# Patient Record
Sex: FEMALE | Race: WHITE | NOT HISPANIC OR LATINO | Employment: OTHER | URBAN - METROPOLITAN AREA
[De-identification: names, ages, dates, MRNs, and addresses within clinical notes are randomized per-mention and may not be internally consistent; named-entity substitution may affect disease eponyms.]

---

## 2017-03-27 ENCOUNTER — HOSPITAL ENCOUNTER (OUTPATIENT)
Dept: LAB | Facility: MEDICAL CENTER | Age: 77
End: 2017-03-27
Attending: SPECIALIST
Payer: MEDICARE

## 2017-03-27 LAB
ALBUMIN SERPL BCP-MCNC: 4.2 G/DL (ref 3.2–4.9)
ALBUMIN/GLOB SERPL: 1.2 G/DL
ALP SERPL-CCNC: 49 U/L (ref 30–99)
ALT SERPL-CCNC: 20 U/L (ref 2–50)
ANION GAP SERPL CALC-SCNC: 7 MMOL/L (ref 0–11.9)
AST SERPL-CCNC: 16 U/L (ref 12–45)
BASOPHILS # BLD AUTO: 0.05 K/UL (ref 0–0.12)
BASOPHILS NFR BLD AUTO: 0.8 % (ref 0–1.8)
BILIRUB SERPL-MCNC: 0.9 MG/DL (ref 0.1–1.5)
BUN SERPL-MCNC: 15 MG/DL (ref 8–22)
CALCIUM SERPL-MCNC: 9.6 MG/DL (ref 8.5–10.5)
CANCER AG125 SERPL-ACNC: 4.4 U/ML (ref 0–35)
CHLORIDE SERPL-SCNC: 100 MMOL/L (ref 96–112)
CO2 SERPL-SCNC: 28 MMOL/L (ref 20–33)
CREAT SERPL-MCNC: 0.69 MG/DL (ref 0.5–1.4)
EOSINOPHIL # BLD: 0.15 K/UL (ref 0–0.51)
EOSINOPHIL NFR BLD AUTO: 2.5 % (ref 0–6.9)
ERYTHROCYTE [DISTWIDTH] IN BLOOD BY AUTOMATED COUNT: 44.5 FL (ref 35.9–50)
GLOBULIN SER CALC-MCNC: 3.4 G/DL (ref 1.9–3.5)
GLUCOSE SERPL-MCNC: 121 MG/DL (ref 65–99)
HCT VFR BLD AUTO: 44.8 % (ref 37–47)
HGB BLD-MCNC: 15.2 G/DL (ref 12–16)
IMM GRANULOCYTES # BLD AUTO: 0.02 K/UL (ref 0–0.11)
IMM GRANULOCYTES NFR BLD AUTO: 0.3 % (ref 0–0.9)
LYMPHOCYTES # BLD: 1.32 K/UL (ref 1–4.8)
LYMPHOCYTES NFR BLD AUTO: 22.3 % (ref 22–41)
MCH RBC QN AUTO: 33 PG (ref 27–33)
MCHC RBC AUTO-ENTMCNC: 33.9 G/DL (ref 33.6–35)
MCV RBC AUTO: 97.4 FL (ref 81.4–97.8)
MONOCYTES # BLD: 0.61 K/UL (ref 0–0.85)
MONOCYTES NFR BLD AUTO: 10.3 % (ref 0–13.4)
NEUTROPHILS # BLD: 3.78 K/UL (ref 2–7.15)
NEUTROPHILS NFR BLD AUTO: 63.8 % (ref 44–72)
NRBC # BLD AUTO: 0.02 K/UL
NRBC BLD-RTO: 0.3 /100 WBC
PLATELET # BLD AUTO: 203 K/UL (ref 164–446)
PMV BLD AUTO: 10 FL (ref 9–12.9)
POTASSIUM SERPL-SCNC: 4.2 MMOL/L (ref 3.6–5.5)
PROT SERPL-MCNC: 7.6 G/DL (ref 6–8.2)
RBC # BLD AUTO: 4.6 M/UL (ref 4.2–5.4)
SODIUM SERPL-SCNC: 135 MMOL/L (ref 135–145)
WBC # BLD AUTO: 5.9 K/UL (ref 4.8–10.8)

## 2017-03-27 PROCEDURE — 36415 COLL VENOUS BLD VENIPUNCTURE: CPT

## 2017-03-27 PROCEDURE — 86304 IMMUNOASSAY TUMOR CA 125: CPT

## 2017-03-27 PROCEDURE — 80053 COMPREHEN METABOLIC PANEL: CPT

## 2017-03-27 PROCEDURE — 85025 COMPLETE CBC W/AUTO DIFF WBC: CPT

## 2018-01-22 ENCOUNTER — HOSPITAL ENCOUNTER (OUTPATIENT)
Dept: RADIOLOGY | Facility: MEDICAL CENTER | Age: 78
End: 2018-01-22
Attending: SPECIALIST
Payer: MEDICARE

## 2018-01-22 DIAGNOSIS — Z12.31 ENCOUNTER FOR SCREENING MAMMOGRAM FOR MALIGNANT NEOPLASM OF BREAST: ICD-10-CM

## 2018-01-22 PROCEDURE — 77067 SCR MAMMO BI INCL CAD: CPT

## 2019-01-18 ENCOUNTER — OFFICE VISIT (OUTPATIENT)
Dept: URGENT CARE | Facility: CLINIC | Age: 79
End: 2019-01-18
Payer: MEDICARE

## 2019-01-18 VITALS
HEIGHT: 63 IN | DIASTOLIC BLOOD PRESSURE: 80 MMHG | OXYGEN SATURATION: 95 % | BODY MASS INDEX: 30.3 KG/M2 | WEIGHT: 171 LBS | HEART RATE: 78 BPM | TEMPERATURE: 97.4 F | SYSTOLIC BLOOD PRESSURE: 114 MMHG | RESPIRATION RATE: 16 BRPM

## 2019-01-18 DIAGNOSIS — J06.9 UPPER RESPIRATORY TRACT INFECTION, UNSPECIFIED TYPE: ICD-10-CM

## 2019-01-18 PROCEDURE — 99203 OFFICE O/P NEW LOW 30 MIN: CPT | Performed by: PHYSICIAN ASSISTANT

## 2019-01-18 RX ORDER — POTASSIUM CHLORIDE 750 MG/1
CAPSULE, EXTENDED RELEASE ORAL
COMMUNITY
Start: 2019-01-14

## 2019-01-18 RX ORDER — BENZONATATE 100 MG/1
100 CAPSULE ORAL 3 TIMES DAILY PRN
Qty: 30 CAP | Refills: 0 | Status: SHIPPED | OUTPATIENT
Start: 2019-01-18 | End: 2022-10-07

## 2019-01-18 ASSESSMENT — ENCOUNTER SYMPTOMS
NECK PAIN: 0
RHINORRHEA: 1
COUGH: 1
TINGLING: 0
HEADACHES: 0
EYE DISCHARGE: 0
DIZZINESS: 0
CHILLS: 0
SORE THROAT: 1
MYALGIAS: 0
DIARRHEA: 0
EYE REDNESS: 0
SHORTNESS OF BREATH: 0
SPUTUM PRODUCTION: 1
FEVER: 0
WHEEZING: 0
VOMITING: 0

## 2019-01-18 NOTE — PROGRESS NOTES
Subjective:      Deana Monreal is a 78 y.o. female who presents with Cough (x 2 days, nonproductive cough, little nasal congestion, post nasal drip.  No sore throat today. )            Pt is an 77 y/o female who presents to  with sore throat that started 2 days ago- this is notably improved however then she started to have congestion and slight cough. She reports having her influenza vaccine- although reports that she was around large group of people recently and wanted to make sure. She denies any body aches or fevers at this time.         Cough   This is a new problem. Episode onset: 2-3 days ago. The problem has been waxing and waning. The problem occurs every few minutes. The cough is non-productive (Rare sputum production). Associated symptoms include nasal congestion, postnasal drip, rhinorrhea and a sore throat. Pertinent negatives include no chest pain, chills, ear pain, eye redness, fever, headaches, myalgias, rash, shortness of breath or wheezing. Nothing aggravates the symptoms. She has tried OTC cough suppressant (Robitussin cough drops) for the symptoms. There is no history of asthma or bronchitis.       Review of Systems   Constitutional: Negative for chills, fever and malaise/fatigue.   HENT: Positive for congestion, postnasal drip, rhinorrhea and sore throat. Negative for ear discharge and ear pain.    Eyes: Negative for discharge and redness.   Respiratory: Positive for cough and sputum production. Negative for shortness of breath and wheezing.    Cardiovascular: Negative for chest pain.   Gastrointestinal: Negative for diarrhea and vomiting.   Genitourinary: Negative for dysuria and urgency.   Musculoskeletal: Negative for myalgias and neck pain.   Skin: Negative for itching and rash.   Neurological: Negative for dizziness, tingling and headaches.   All other systems reviewed and are negative.         Objective:     /80 (BP Location: Left arm, Patient Position: Sitting, BP Cuff Size:  "Adult)   Pulse 78   Temp 36.3 °C (97.4 °F) (Temporal)   Resp 16   Ht 1.6 m (5' 3\")   Wt 77.6 kg (171 lb)   SpO2 95%   BMI 30.29 kg/m²    PMH:  has a past medical history of CATARACT and Hypertension.  MEDS:   Current Outpatient Prescriptions:   •  benzonatate (TESSALON) 100 MG Cap, Take 1 Cap by mouth 3 times a day as needed., Disp: 30 Cap, Rfl: 0  •  oxymetazoline (AFRIN) 0.05 % SOLN, Spray 2 Sprays in nose 2 times a day. Do not use greater than 3-5 ritesh , Disp: , Rfl:   •  metoprolol SR (TOPROL XL) 100 MG TB24, Take 100 mg by mouth every day., Disp: , Rfl:   •  valsartan-hydrochlorothiazide (DIOVAN HCT) 160-12.5 MG per tablet, Take 1 Tab by mouth every day., Disp: , Rfl:   •  potassium chloride (MICRO-K) 10 MEQ capsule, , Disp: , Rfl:   •  azithromycin (ZITHROMAX) 250 MG TABS, z-katie; U.D., Disp: 6 Tab, Rfl: 1  •  metoclopramide (REGLAN) 10 MG TABS, Take 10 mg by mouth every 6 hours as needed. 1 tablet before meals and at bedtime , Disp: , Rfl:   •  Non Formulary Request, Vitamin D, Magnesium 400 mg, omega 3; daily , Disp: , Rfl:   ALLERGIES:   Allergies   Allergen Reactions   • Other Environmental Runny Nose     hayfever-runny nose     SURGHX:   Past Surgical History:   Procedure Laterality Date   • HYSTERECTOMY ROBOTIC  4/23/2012    Performed by NAI HUITRON at [facility]   • LYMPH NODE EXCISION  4/23/2012    Performed by NAI HUITRON at [facility]   • DEBULKING  4/23/2012    Performed by NAI HUITRON at [facility]   • ABDOMINAL HYSTERECTOMY TOTAL  4/23/2012    Performed by NAI HUITRON at [facility]   • OMENTECTOMY  4/23/2012    Performed by ANI HUITRON at [facility]ERY [facility]   • OTHER  1/18/2012    detached retina, vitrectomy left eye, [place]   • PB APPENDECTOMY  12/29/2010    Dr. Gilmore   • US-NEEDLE CORE BX-BREAST PANEL       SOCHX:  reports that she has never smoked. She has never used smokeless tobacco. She reports that she drinks " alcohol. She reports that she does not use drugs.  FH: Family history was reviewed, no pertinent findings to report    Physical Exam   Constitutional: She is oriented to person, place, and time. She appears well-developed and well-nourished.   HENT:   Head: Normocephalic and atraumatic.   Mouth/Throat: No oropharyngeal exudate.   Ears- Canals clear- TM- with clear fluid effusions bilaterally.   Pos. PND, with slight erythema- without tonsillar edema or exudate.   Mild discharge noted bilaterally- to nares.      Eyes: Pupils are equal, round, and reactive to light. EOM are normal.   Neck: Normal range of motion. Neck supple.   Cardiovascular: Normal rate and regular rhythm.    No murmur heard.  Pulmonary/Chest: Effort normal and breath sounds normal. No respiratory distress.   Musculoskeletal: Normal range of motion. She exhibits no tenderness.   Lymphadenopathy:     She has no cervical adenopathy.   Neurological: She is alert and oriented to person, place, and time.   Skin: Skin is warm. No rash noted.   Psychiatric: She has a normal mood and affect. Her behavior is normal.   Vitals reviewed.              Assessment/Plan:     1. Upper respiratory tract infection, unspecified type  - benzonatate (TESSALON) 100 MG Cap; Take 1 Cap by mouth 3 times a day as needed.  Dispense: 30 Cap; Refill: 0    It was explained to the pt. Today that due to the viral nature of the pt's illness, we will treat symptomatically today. Encouraged OTC supportive meds PRN. Humidification, increase fluids, avoid night time dairy.   Patient given precautionary s/sx that mandate immediate follow up and evaluation in the ED. Advised of risks of not doing so.    DDX, Supportive care, and indications for immediate follow-up discussed with patient.    Instructed to return to clinic or nearest emergency department if we are not available for any change in condition, further concerns, or worsening of symptoms.    The patient demonstrated a good  understanding and agreed with the treatment plan.

## 2019-04-01 ENCOUNTER — HOSPITAL ENCOUNTER (OUTPATIENT)
Dept: RADIOLOGY | Facility: MEDICAL CENTER | Age: 79
End: 2019-04-01
Attending: INTERNAL MEDICINE
Payer: MEDICARE

## 2019-04-01 DIAGNOSIS — Z12.31 VISIT FOR SCREENING MAMMOGRAM: ICD-10-CM

## 2019-04-01 PROCEDURE — 77063 BREAST TOMOSYNTHESIS BI: CPT

## 2019-05-10 ENCOUNTER — HOSPITAL ENCOUNTER (OUTPATIENT)
Dept: LAB | Facility: MEDICAL CENTER | Age: 79
End: 2019-05-10
Attending: SPECIALIST
Payer: MEDICARE

## 2019-05-10 LAB — CANCER AG125 SERPL-ACNC: 5.4 U/ML (ref 0–35)

## 2019-05-10 PROCEDURE — 36415 COLL VENOUS BLD VENIPUNCTURE: CPT

## 2019-05-10 PROCEDURE — 86304 IMMUNOASSAY TUMOR CA 125: CPT

## 2020-08-25 ENCOUNTER — HOSPITAL ENCOUNTER (OUTPATIENT)
Dept: LAB | Facility: MEDICAL CENTER | Age: 80
End: 2020-08-25
Attending: SPECIALIST
Payer: MEDICARE

## 2020-08-25 LAB — CANCER AG125 SERPL-ACNC: 17.3 U/ML (ref 0–35)

## 2020-08-25 PROCEDURE — 86304 IMMUNOASSAY TUMOR CA 125: CPT | Mod: GA

## 2020-08-25 PROCEDURE — 36415 COLL VENOUS BLD VENIPUNCTURE: CPT | Mod: GA

## 2020-09-24 ENCOUNTER — HOSPITAL ENCOUNTER (OUTPATIENT)
Dept: RADIOLOGY | Facility: MEDICAL CENTER | Age: 80
End: 2020-09-24
Attending: INTERNAL MEDICINE
Payer: MEDICARE

## 2020-09-24 DIAGNOSIS — R10.9 ABDOMINAL PAIN, RIGHT LATERAL: ICD-10-CM

## 2020-09-24 PROCEDURE — 76830 TRANSVAGINAL US NON-OB: CPT

## 2020-09-24 PROCEDURE — 76700 US EXAM ABDOM COMPLETE: CPT

## 2021-01-12 DIAGNOSIS — Z23 NEED FOR VACCINATION: ICD-10-CM

## 2021-01-21 ENCOUNTER — IMMUNIZATION (OUTPATIENT)
Dept: FAMILY PLANNING/WOMEN'S HEALTH CLINIC | Facility: IMMUNIZATION CENTER | Age: 81
End: 2021-01-21
Attending: INTERNAL MEDICINE
Payer: MEDICARE

## 2021-01-21 DIAGNOSIS — Z23 NEED FOR VACCINATION: ICD-10-CM

## 2021-01-21 DIAGNOSIS — Z23 ENCOUNTER FOR VACCINATION: Primary | ICD-10-CM

## 2021-01-21 PROCEDURE — 0001A PFIZER SARS-COV-2 VACCINE: CPT

## 2021-01-21 PROCEDURE — 91300 PFIZER SARS-COV-2 VACCINE: CPT

## 2021-02-11 ENCOUNTER — IMMUNIZATION (OUTPATIENT)
Dept: FAMILY PLANNING/WOMEN'S HEALTH CLINIC | Facility: IMMUNIZATION CENTER | Age: 81
End: 2021-02-11
Attending: INTERNAL MEDICINE
Payer: MEDICARE

## 2021-02-11 DIAGNOSIS — Z23 ENCOUNTER FOR VACCINATION: Primary | ICD-10-CM

## 2021-02-11 PROCEDURE — 0002A PFIZER SARS-COV-2 VACCINE: CPT

## 2021-02-11 PROCEDURE — 91300 PFIZER SARS-COV-2 VACCINE: CPT

## 2022-10-07 ENCOUNTER — OFFICE VISIT (OUTPATIENT)
Dept: URGENT CARE | Facility: CLINIC | Age: 82
End: 2022-10-07
Payer: MEDICARE

## 2022-10-07 VITALS
BODY MASS INDEX: 26.58 KG/M2 | SYSTOLIC BLOOD PRESSURE: 154 MMHG | RESPIRATION RATE: 16 BRPM | OXYGEN SATURATION: 95 % | HEART RATE: 78 BPM | DIASTOLIC BLOOD PRESSURE: 82 MMHG | TEMPERATURE: 98.7 F | HEIGHT: 63 IN | WEIGHT: 150 LBS

## 2022-10-07 DIAGNOSIS — R03.0 ELEVATED BLOOD PRESSURE READING: ICD-10-CM

## 2022-10-07 PROCEDURE — 99203 OFFICE O/P NEW LOW 30 MIN: CPT | Performed by: FAMILY MEDICINE

## 2022-10-07 RX ORDER — OLMESARTAN MEDOXOMIL 40 MG/1
TABLET ORAL
Status: ON HOLD | COMMUNITY
Start: 2022-08-02 | End: 2023-07-31

## 2022-10-07 RX ORDER — CLONIDINE HYDROCHLORIDE 0.1 MG/1
TABLET ORAL
Status: ON HOLD | COMMUNITY
Start: 2022-08-19 | End: 2023-07-31

## 2022-10-07 ASSESSMENT — ENCOUNTER SYMPTOMS
EYE REDNESS: 0
NAUSEA: 0
EYE DISCHARGE: 0
MYALGIAS: 0
WEIGHT LOSS: 0
VOMITING: 0

## 2022-10-08 NOTE — PROGRESS NOTES
"Subjective     Deana Monreal is a 82 y.o. female who presents with Blood Pressure Problem (Blood pressure has been high for past 3 days / 173 / 106 )            3 days elevated blood pressure reading. Highest last night 173/106. This morning 149/97. She has been taking her olmesartan an metoprolol as prescribed. She has previously had good control with this combination. She took 0.1mg clonidine without improvment of BP last night. No HA. No vision change. No unilateral weakness. No CP. No change in urine output. No PMH CAD. She did take another clonidine today at 14:45. No OTC medications. No other aggravating or alleviating factors.        Review of Systems   Constitutional:  Negative for malaise/fatigue and weight loss.   Eyes:  Negative for discharge and redness.   Gastrointestinal:  Negative for nausea and vomiting.   Musculoskeletal:  Negative for joint pain and myalgias.   Skin:  Negative for itching and rash.            Objective     BP (!) 154/82 (BP Location: Left arm, Patient Position: Sitting, BP Cuff Size: Adult)   Pulse 78   Temp 37.1 °C (98.7 °F) (Temporal)   Resp 16   Ht 1.6 m (5' 3\")   Wt 68 kg (150 lb)   SpO2 95%   BMI 26.57 kg/m²      Physical Exam  Constitutional:       General: She is not in acute distress.     Appearance: She is well-developed.   HENT:      Head: Normocephalic and atraumatic.   Eyes:      Conjunctiva/sclera: Conjunctivae normal.   Cardiovascular:      Rate and Rhythm: Normal rate and regular rhythm.      Heart sounds: Normal heart sounds. No murmur heard.  Pulmonary:      Effort: Pulmonary effort is normal.      Breath sounds: Normal breath sounds. No wheezing.   Skin:     General: Skin is warm and dry.      Findings: No rash.   Neurological:      General: No focal deficit present.      Mental Status: She is alert and oriented to person, place, and time.                           Assessment & Plan          1. Elevated blood pressure reading          Differential " diagnosis, natural history, supportive care, and indications for immediate follow-up discussed at length.     No clinical evidence of endorgan damage.  She will increase her nightly metoprolol dose from 50 to 100 mg and continue blood pressure log.  Discussed using clonidine sparingly and discussed possibility for rebound with clonidine.  Follow-up with primary care on Monday.

## 2022-11-11 ENCOUNTER — PATIENT MESSAGE (OUTPATIENT)
Dept: HEALTH INFORMATION MANAGEMENT | Facility: OTHER | Age: 82
End: 2022-11-11

## 2023-02-16 ENCOUNTER — HOSPITAL ENCOUNTER (OUTPATIENT)
Dept: LAB | Facility: MEDICAL CENTER | Age: 83
End: 2023-02-16
Payer: MEDICARE

## 2023-02-16 LAB
ANION GAP SERPL CALC-SCNC: 13 MMOL/L (ref 7–16)
BUN SERPL-MCNC: 10 MG/DL (ref 8–22)
CALCIUM SERPL-MCNC: 9.4 MG/DL (ref 8.5–10.5)
CANCER AG125 SERPL-ACNC: 33 U/ML (ref 0–35)
CHLORIDE SERPL-SCNC: 100 MMOL/L (ref 96–112)
CO2 SERPL-SCNC: 25 MMOL/L (ref 20–33)
CREAT SERPL-MCNC: 0.58 MG/DL (ref 0.5–1.4)
GFR SERPLBLD CREATININE-BSD FMLA CKD-EPI: 89 ML/MIN/1.73 M 2
GLUCOSE SERPL-MCNC: 110 MG/DL (ref 65–99)
POTASSIUM SERPL-SCNC: 4.2 MMOL/L (ref 3.6–5.5)
SODIUM SERPL-SCNC: 138 MMOL/L (ref 135–145)

## 2023-02-16 PROCEDURE — 36415 COLL VENOUS BLD VENIPUNCTURE: CPT

## 2023-02-16 PROCEDURE — 86304 IMMUNOASSAY TUMOR CA 125: CPT

## 2023-02-16 PROCEDURE — 80048 BASIC METABOLIC PNL TOTAL CA: CPT

## 2023-02-26 ENCOUNTER — HOSPITAL ENCOUNTER (OUTPATIENT)
Dept: RADIOLOGY | Facility: MEDICAL CENTER | Age: 83
End: 2023-02-26
Payer: MEDICARE

## 2023-02-26 DIAGNOSIS — C56.9 MALIGNANT NEOPLASM OF OVARY, UNSPECIFIED LATERALITY (HCC): ICD-10-CM

## 2023-02-26 DIAGNOSIS — R19.09 OTHER INTRA-ABDOMINAL AND PELVIC SWELLING, MASS AND LUMP: ICD-10-CM

## 2023-02-26 PROCEDURE — 74177 CT ABD & PELVIS W/CONTRAST: CPT

## 2023-02-26 PROCEDURE — 700117 HCHG RX CONTRAST REV CODE 255

## 2023-02-26 RX ADMIN — IOHEXOL 100 ML: 350 INJECTION, SOLUTION INTRAVENOUS at 14:55

## 2023-02-26 RX ADMIN — IOHEXOL 20 ML: 240 INJECTION, SOLUTION INTRATHECAL; INTRAVASCULAR; INTRAVENOUS; ORAL at 14:54

## 2023-04-20 ENCOUNTER — OFFICE VISIT (OUTPATIENT)
Dept: URGENT CARE | Facility: CLINIC | Age: 83
End: 2023-04-20
Payer: MEDICARE

## 2023-04-20 VITALS
TEMPERATURE: 98.3 F | WEIGHT: 155 LBS | HEART RATE: 64 BPM | DIASTOLIC BLOOD PRESSURE: 64 MMHG | BODY MASS INDEX: 27.46 KG/M2 | HEIGHT: 63 IN | OXYGEN SATURATION: 95 % | RESPIRATION RATE: 16 BRPM | SYSTOLIC BLOOD PRESSURE: 122 MMHG

## 2023-04-20 DIAGNOSIS — R03.0 ELEVATED BLOOD PRESSURE READING: ICD-10-CM

## 2023-04-20 PROBLEM — M25.552 PAIN OF LEFT HIP JOINT: Status: ACTIVE | Noted: 2019-06-25

## 2023-04-20 PROBLEM — M25.569 KNEE PAIN: Status: ACTIVE | Noted: 2019-06-25

## 2023-04-20 PROCEDURE — 99213 OFFICE O/P EST LOW 20 MIN: CPT | Performed by: PHYSICIAN ASSISTANT

## 2023-04-20 RX ORDER — POLYETHYLENE GLYCOL-3350 AND ELECTROLYTES 236; 6.74; 5.86; 2.97; 22.74 G/274.31G; G/274.31G; G/274.31G; G/274.31G; G/274.31G
POWDER, FOR SOLUTION ORAL
Status: ON HOLD | COMMUNITY
Start: 2023-03-08 | End: 2023-07-31

## 2023-04-20 RX ORDER — COVID-19 MOLECULAR TEST ASSAY
KIT MISCELLANEOUS
Status: ON HOLD | COMMUNITY
Start: 2023-01-26 | End: 2023-07-31

## 2023-04-20 RX ORDER — DONEPEZIL HYDROCHLORIDE 10 MG/1
TABLET, FILM COATED ORAL
Status: ON HOLD | COMMUNITY
Start: 2023-02-02 | End: 2023-07-31

## 2023-04-20 RX ORDER — MELOXICAM 15 MG/1
TABLET ORAL
Status: ON HOLD | COMMUNITY
Start: 2023-04-11 | End: 2023-07-31

## 2023-04-20 ASSESSMENT — ENCOUNTER SYMPTOMS
CHILLS: 0
FEVER: 0
ABDOMINAL PAIN: 0
EYE REDNESS: 0
COUGH: 0
WHEEZING: 0
VOMITING: 0
SORE THROAT: 0
EYE PAIN: 0
SHORTNESS OF BREATH: 0
DIAPHORESIS: 0
SINUS PAIN: 0
NAUSEA: 0
HEADACHES: 0
EYE DISCHARGE: 0
DIARRHEA: 0
DIZZINESS: 0
CONSTIPATION: 0

## 2023-04-21 NOTE — PROGRESS NOTES
"  Subjective:     Deana Monreal  is a 82 y.o. female who presents for Hypertension Follow-up (X2 days it went up after having a colonoscopy /Took clonodine 4:30pm today)       She presents today after having elevated blood pressure reading earlier today.  At home she had a blood pressure reading of 170/92 which did improve after she took a clonidine.  She does note that a few days ago she had elevated blood pressure readings of 160's/90 that was also resolved with clonidine.  She does have concern about the elevated blood pressure readings and presents today for evaluation.  She denies headache, no vision change or blurry vision, no change in hearing, no chest pain or shortness of breath, no upper or lower extremity weakness numbness or tingling.  She did have a colonoscopy 2 days ago, no residual abdominal pain, no rectal pain or bleeding, no urinary frequency or urgency, no pain with urination.  No fever/chills/sweats.     Review of Systems   Constitutional:  Negative for chills, diaphoresis, fever and malaise/fatigue.        Elevated blood pressure readings at home   HENT:  Negative for congestion, ear discharge, sinus pain and sore throat.    Eyes:  Negative for pain, discharge and redness.   Respiratory:  Negative for cough, shortness of breath and wheezing.    Cardiovascular:  Negative for chest pain.   Gastrointestinal:  Negative for abdominal pain, constipation, diarrhea, nausea and vomiting.   Genitourinary:  Negative for dysuria, frequency and urgency.   Neurological:  Negative for dizziness and headaches.    Allergies   Allergen Reactions    Other Environmental Runny Nose     hayfever-runny nose     Past Medical History:   Diagnosis Date    CATARACT     Hypertension         Objective:   /64 (BP Location: Left arm, Patient Position: Sitting, BP Cuff Size: Adult)   Pulse 64   Temp 36.8 °C (98.3 °F) (Temporal)   Resp 16   Ht 1.6 m (5' 3\")   Wt 70.3 kg (155 lb)   SpO2 95%   BMI 27.46 kg/m² "   Physical Exam  Vitals and nursing note reviewed.   Constitutional:       General: She is not in acute distress.     Appearance: Normal appearance. She is not ill-appearing, toxic-appearing or diaphoretic.   HENT:      Head: Normocephalic.      Right Ear: Tympanic membrane, ear canal and external ear normal. There is no impacted cerumen.      Left Ear: Tympanic membrane, ear canal and external ear normal. There is no impacted cerumen.      Nose: No congestion or rhinorrhea.      Mouth/Throat:      Mouth: Mucous membranes are moist.      Pharynx: No oropharyngeal exudate or posterior oropharyngeal erythema.   Eyes:      General:         Right eye: No discharge.         Left eye: No discharge.      Extraocular Movements: Extraocular movements intact.      Conjunctiva/sclera: Conjunctivae normal.      Pupils: Pupils are equal, round, and reactive to light.      Comments: No nystagmus on exam   Cardiovascular:      Rate and Rhythm: Normal rate and regular rhythm.   Pulmonary:      Effort: Pulmonary effort is normal. No respiratory distress.      Breath sounds: Normal breath sounds. No stridor. No wheezing or rhonchi.   Musculoskeletal:      Cervical back: Neck supple.   Lymphadenopathy:      Cervical: No cervical adenopathy.   Neurological:      General: No focal deficit present.      Mental Status: She is alert and oriented to person, place, and time.      Cranial Nerves: No cranial nerve deficit.      Sensory: No sensory deficit.   Psychiatric:         Mood and Affect: Mood normal.         Behavior: Behavior normal.         Thought Content: Thought content normal.         Judgment: Judgment normal.           Diagnostic testing: None    Assessment/Plan:     Encounter Diagnoses   Name Primary?    Elevated blood pressure reading           Plan for care for today's complaint includes continued watchful waiting technique at this time.  Patient did have elevated blood pressure readings that was fully resolved with  clonidine.  Discussed with patient that she does not show any signs of endorgan failure based on physical exam findings, recent lab work did show kidneys of normal function.  Discussed with the patient to follow-up with her primary care provider for reevaluation of her elevated blood pressure readings.  We discussed ER precautions, if these do arise and follow-up in the emergency department immediately for reevaluation..    See AVS Instructions below for written guidance provided to patient on after-visit management and care in addition to our verbal discussion during the visit.    Please note that this dictation was created using voice recognition software. I have attempted to correct all errors, but there may be sound-alike, spelling, grammar and possibly content errors that I did not discover before finalizing the note.    Gillespie Ernesto BROWN

## 2023-05-22 ENCOUNTER — HOSPITAL ENCOUNTER (OUTPATIENT)
Dept: RADIOLOGY | Facility: MEDICAL CENTER | Age: 83
End: 2023-05-22
Attending: SPECIALIST
Payer: MEDICARE

## 2023-05-22 DIAGNOSIS — R19.04 LEFT LOWER QUADRANT ABDOMINAL SWELLING, MASS AND LUMP: ICD-10-CM

## 2023-05-22 DIAGNOSIS — C56.9 MALIGNANT NEOPLASM OF OVARY, UNSPECIFIED LATERALITY (HCC): ICD-10-CM

## 2023-05-22 PROCEDURE — 74177 CT ABD & PELVIS W/CONTRAST: CPT

## 2023-05-22 PROCEDURE — 700117 HCHG RX CONTRAST REV CODE 255: Performed by: SPECIALIST

## 2023-05-22 RX ADMIN — IOHEXOL 100 ML: 350 INJECTION, SOLUTION INTRAVENOUS at 15:28

## 2023-05-22 RX ADMIN — IOHEXOL 25 ML: 240 INJECTION, SOLUTION INTRATHECAL; INTRAVASCULAR; INTRAVENOUS; ORAL at 15:29

## 2023-07-31 ENCOUNTER — APPOINTMENT (OUTPATIENT)
Dept: RADIOLOGY | Facility: MEDICAL CENTER | Age: 83
DRG: 863 | End: 2023-07-31
Attending: STUDENT IN AN ORGANIZED HEALTH CARE EDUCATION/TRAINING PROGRAM
Payer: MEDICARE

## 2023-07-31 ENCOUNTER — HOSPITAL ENCOUNTER (INPATIENT)
Facility: MEDICAL CENTER | Age: 83
LOS: 4 days | DRG: 863 | End: 2023-08-04
Attending: SPECIALIST | Admitting: SPECIALIST
Payer: MEDICARE

## 2023-07-31 PROBLEM — L03.90 CELLULITIS: Status: ACTIVE | Noted: 2023-07-31

## 2023-07-31 LAB
BASOPHILS # BLD AUTO: 0.6 % (ref 0–1.8)
BASOPHILS # BLD: 0.04 K/UL (ref 0–0.12)
BLOOD CULTURE HOLD CXBCH: NORMAL
BLOOD CULTURE HOLD CXBCH: NORMAL
EOSINOPHIL # BLD AUTO: 0.18 K/UL (ref 0–0.51)
EOSINOPHIL NFR BLD: 2.7 % (ref 0–6.9)
ERYTHROCYTE [DISTWIDTH] IN BLOOD BY AUTOMATED COUNT: 44.2 FL (ref 35.9–50)
HCT VFR BLD AUTO: 33.3 % (ref 37–47)
HGB BLD-MCNC: 11.2 G/DL (ref 12–16)
IMM GRANULOCYTES # BLD AUTO: 0.02 K/UL (ref 0–0.11)
IMM GRANULOCYTES NFR BLD AUTO: 0.3 % (ref 0–0.9)
LYMPHOCYTES # BLD AUTO: 1.47 K/UL (ref 1–4.8)
LYMPHOCYTES NFR BLD: 21.8 % (ref 22–41)
MAGNESIUM SERPL-MCNC: 1.8 MG/DL (ref 1.5–2.5)
MCH RBC QN AUTO: 31.2 PG (ref 27–33)
MCHC RBC AUTO-ENTMCNC: 33.6 G/DL (ref 32.2–35.5)
MCV RBC AUTO: 92.8 FL (ref 81.4–97.8)
MONOCYTES # BLD AUTO: 0.59 K/UL (ref 0–0.85)
MONOCYTES NFR BLD AUTO: 8.7 % (ref 0–13.4)
NEUTROPHILS # BLD AUTO: 4.45 K/UL (ref 1.82–7.42)
NEUTROPHILS NFR BLD: 65.9 % (ref 44–72)
NRBC # BLD AUTO: 0 K/UL
NRBC BLD-RTO: 0 /100 WBC (ref 0–0.2)
PLATELET # BLD AUTO: 237 K/UL (ref 164–446)
PMV BLD AUTO: 8.7 FL (ref 9–12.9)
RBC # BLD AUTO: 3.59 M/UL (ref 4.2–5.4)
WBC # BLD AUTO: 6.8 K/UL (ref 4.8–10.8)

## 2023-07-31 PROCEDURE — 83735 ASSAY OF MAGNESIUM: CPT

## 2023-07-31 PROCEDURE — 36415 COLL VENOUS BLD VENIPUNCTURE: CPT

## 2023-07-31 PROCEDURE — 700111 HCHG RX REV CODE 636 W/ 250 OVERRIDE (IP): Mod: JZ | Performed by: STUDENT IN AN ORGANIZED HEALTH CARE EDUCATION/TRAINING PROGRAM

## 2023-07-31 PROCEDURE — 770001 HCHG ROOM/CARE - MED/SURG/GYN PRIV*

## 2023-07-31 PROCEDURE — 85025 COMPLETE CBC W/AUTO DIFF WBC: CPT

## 2023-07-31 PROCEDURE — 700105 HCHG RX REV CODE 258: Performed by: STUDENT IN AN ORGANIZED HEALTH CARE EDUCATION/TRAINING PROGRAM

## 2023-07-31 RX ORDER — VALSARTAN AND HYDROCHLOROTHIAZIDE 160; 12.5 MG/1; MG/1
1 TABLET, FILM COATED ORAL DAILY
Status: DISCONTINUED | OUTPATIENT
Start: 2023-08-01 | End: 2023-07-31

## 2023-07-31 RX ORDER — HYDROCHLOROTHIAZIDE 12.5 MG/1
12.5 TABLET ORAL
Status: DISCONTINUED | OUTPATIENT
Start: 2023-08-01 | End: 2023-08-04 | Stop reason: HOSPADM

## 2023-07-31 RX ORDER — ACETAMINOPHEN 325 MG/1
650 TABLET ORAL EVERY 6 HOURS PRN
Status: DISCONTINUED | OUTPATIENT
Start: 2023-07-31 | End: 2023-07-31

## 2023-07-31 RX ORDER — ONDANSETRON 2 MG/ML
4 INJECTION INTRAMUSCULAR; INTRAVENOUS EVERY 6 HOURS PRN
Status: DISCONTINUED | OUTPATIENT
Start: 2023-07-31 | End: 2023-08-04 | Stop reason: HOSPADM

## 2023-07-31 RX ORDER — PROCHLORPERAZINE EDISYLATE 5 MG/ML
10 INJECTION INTRAMUSCULAR; INTRAVENOUS EVERY 6 HOURS PRN
Status: DISCONTINUED | OUTPATIENT
Start: 2023-07-31 | End: 2023-08-04 | Stop reason: HOSPADM

## 2023-07-31 RX ORDER — POLYETHYLENE GLYCOL 3350 17 G/17G
1 POWDER, FOR SOLUTION ORAL
Status: DISCONTINUED | OUTPATIENT
Start: 2023-07-31 | End: 2023-08-04 | Stop reason: HOSPADM

## 2023-07-31 RX ORDER — OXYCODONE HYDROCHLORIDE AND ACETAMINOPHEN 5; 325 MG/1; MG/1
1 TABLET ORAL EVERY 6 HOURS PRN
Status: DISCONTINUED | OUTPATIENT
Start: 2023-07-31 | End: 2023-08-04 | Stop reason: HOSPADM

## 2023-07-31 RX ORDER — SODIUM CHLORIDE, SODIUM LACTATE, POTASSIUM CHLORIDE, CALCIUM CHLORIDE 600; 310; 30; 20 MG/100ML; MG/100ML; MG/100ML; MG/100ML
INJECTION, SOLUTION INTRAVENOUS CONTINUOUS
Status: DISCONTINUED | OUTPATIENT
Start: 2023-07-31 | End: 2023-08-04 | Stop reason: HOSPADM

## 2023-07-31 RX ORDER — BISACODYL 10 MG
10 SUPPOSITORY, RECTAL RECTAL
Status: DISCONTINUED | OUTPATIENT
Start: 2023-07-31 | End: 2023-08-04 | Stop reason: HOSPADM

## 2023-07-31 RX ORDER — ACETAMINOPHEN 325 MG/1
650 TABLET ORAL EVERY 4 HOURS PRN
Status: DISCONTINUED | OUTPATIENT
Start: 2023-07-31 | End: 2023-08-04 | Stop reason: HOSPADM

## 2023-07-31 RX ORDER — METOPROLOL SUCCINATE 100 MG/1
100 TABLET, EXTENDED RELEASE ORAL DAILY
Status: DISCONTINUED | OUTPATIENT
Start: 2023-08-01 | End: 2023-08-04 | Stop reason: HOSPADM

## 2023-07-31 RX ORDER — AMOXICILLIN 250 MG
2 CAPSULE ORAL 2 TIMES DAILY
Status: DISCONTINUED | OUTPATIENT
Start: 2023-07-31 | End: 2023-08-04 | Stop reason: HOSPADM

## 2023-07-31 RX ORDER — VALSARTAN 80 MG/1
160 TABLET ORAL
Status: DISCONTINUED | OUTPATIENT
Start: 2023-08-01 | End: 2023-08-04 | Stop reason: HOSPADM

## 2023-07-31 RX ORDER — POTASSIUM CHLORIDE 750 MG/1
10 TABLET, FILM COATED, EXTENDED RELEASE ORAL DAILY
Status: DISCONTINUED | OUTPATIENT
Start: 2023-08-01 | End: 2023-08-04 | Stop reason: HOSPADM

## 2023-07-31 RX ADMIN — SODIUM CHLORIDE, POTASSIUM CHLORIDE, SODIUM LACTATE AND CALCIUM CHLORIDE: 600; 310; 30; 20 INJECTION, SOLUTION INTRAVENOUS at 17:49

## 2023-07-31 RX ADMIN — PIPERACILLIN AND TAZOBACTAM 3.38 G: 3; .375 INJECTION, POWDER, FOR SOLUTION INTRAVENOUS at 17:40

## 2023-07-31 RX ADMIN — PIPERACILLIN AND TAZOBACTAM 3.38 G: 3; .375 INJECTION, POWDER, FOR SOLUTION INTRAVENOUS at 19:53

## 2023-07-31 ASSESSMENT — COGNITIVE AND FUNCTIONAL STATUS - GENERAL
SUGGESTED CMS G CODE MODIFIER DAILY ACTIVITY: CH
SUGGESTED CMS G CODE MODIFIER MOBILITY: CH
MOBILITY SCORE: 24
DAILY ACTIVITIY SCORE: 24

## 2023-07-31 ASSESSMENT — LIFESTYLE VARIABLES
ALCOHOL_USE: YES
CONSUMPTION TOTAL: NEGATIVE
ON A TYPICAL DAY WHEN YOU DRINK ALCOHOL HOW MANY DRINKS DO YOU HAVE: 1
DOES PATIENT WANT TO STOP DRINKING: NO
AVERAGE NUMBER OF DAYS PER WEEK YOU HAVE A DRINK CONTAINING ALCOHOL: 1
HAVE PEOPLE ANNOYED YOU BY CRITICIZING YOUR DRINKING: NO
EVER FELT BAD OR GUILTY ABOUT YOUR DRINKING: NO
EVER HAD A DRINK FIRST THING IN THE MORNING TO STEADY YOUR NERVES TO GET RID OF A HANGOVER: NO
TOTAL SCORE: 0
HAVE YOU EVER FELT YOU SHOULD CUT DOWN ON YOUR DRINKING: NO
TOTAL SCORE: 0
HOW MANY TIMES IN THE PAST YEAR HAVE YOU HAD 5 OR MORE DRINKS IN A DAY: 0
TOTAL SCORE: 0

## 2023-07-31 ASSESSMENT — PATIENT HEALTH QUESTIONNAIRE - PHQ9
SUM OF ALL RESPONSES TO PHQ9 QUESTIONS 1 AND 2: 0
2. FEELING DOWN, DEPRESSED, IRRITABLE, OR HOPELESS: NOT AT ALL
1. LITTLE INTEREST OR PLEASURE IN DOING THINGS: NOT AT ALL

## 2023-07-31 ASSESSMENT — FIBROSIS 4 INDEX: FIB4 SCORE: 1.7

## 2023-07-31 ASSESSMENT — PAIN DESCRIPTION - PAIN TYPE: TYPE: ACUTE PAIN

## 2023-08-01 ENCOUNTER — HOSPITAL ENCOUNTER (OUTPATIENT)
Dept: RADIOLOGY | Facility: MEDICAL CENTER | Age: 83
End: 2023-08-01
Attending: STUDENT IN AN ORGANIZED HEALTH CARE EDUCATION/TRAINING PROGRAM
Payer: MEDICARE

## 2023-08-01 LAB
ALBUMIN SERPL BCP-MCNC: 3.2 G/DL (ref 3.2–4.9)
ALBUMIN SERPL BCP-MCNC: 3.2 G/DL (ref 3.2–4.9)
ALBUMIN/GLOB SERPL: 1 G/DL
ALP SERPL-CCNC: 65 U/L (ref 30–99)
ALT SERPL-CCNC: 7 U/L (ref 2–50)
ANION GAP SERPL CALC-SCNC: 9 MMOL/L (ref 7–16)
AST SERPL-CCNC: 11 U/L (ref 12–45)
BILIRUB SERPL-MCNC: 0.6 MG/DL (ref 0.1–1.5)
BUN SERPL-MCNC: 5 MG/DL (ref 8–22)
BUN SERPL-MCNC: 6 MG/DL (ref 8–22)
CALCIUM ALBUM COR SERPL-MCNC: 9.2 MG/DL (ref 8.5–10.5)
CALCIUM ALBUM COR SERPL-MCNC: 9.5 MG/DL (ref 8.5–10.5)
CALCIUM SERPL-MCNC: 8.6 MG/DL (ref 8.5–10.5)
CALCIUM SERPL-MCNC: 8.9 MG/DL (ref 8.5–10.5)
CHLORIDE SERPL-SCNC: 103 MMOL/L (ref 96–112)
CHLORIDE SERPL-SCNC: 104 MMOL/L (ref 96–112)
CO2 SERPL-SCNC: 23 MMOL/L (ref 20–33)
CO2 SERPL-SCNC: 24 MMOL/L (ref 20–33)
CREAT SERPL-MCNC: 0.52 MG/DL (ref 0.5–1.4)
CREAT SERPL-MCNC: 0.54 MG/DL (ref 0.5–1.4)
ERYTHROCYTE [DISTWIDTH] IN BLOOD BY AUTOMATED COUNT: 43.7 FL (ref 35.9–50)
GFR SERPLBLD CREATININE-BSD FMLA CKD-EPI: 90 ML/MIN/1.73 M 2
GFR SERPLBLD CREATININE-BSD FMLA CKD-EPI: 91 ML/MIN/1.73 M 2
GLOBULIN SER CALC-MCNC: 3.3 G/DL (ref 1.9–3.5)
GLUCOSE SERPL-MCNC: 102 MG/DL (ref 65–99)
GLUCOSE SERPL-MCNC: 105 MG/DL (ref 65–99)
HCT VFR BLD AUTO: 35.1 % (ref 37–47)
HGB BLD-MCNC: 11.7 G/DL (ref 12–16)
MCH RBC QN AUTO: 30.9 PG (ref 27–33)
MCHC RBC AUTO-ENTMCNC: 33.3 G/DL (ref 32.2–35.5)
MCV RBC AUTO: 92.6 FL (ref 81.4–97.8)
PHOSPHATE SERPL-MCNC: 3.6 MG/DL (ref 2.5–4.5)
PLATELET # BLD AUTO: 244 K/UL (ref 164–446)
PMV BLD AUTO: 8.9 FL (ref 9–12.9)
POTASSIUM SERPL-SCNC: 3.5 MMOL/L (ref 3.6–5.5)
POTASSIUM SERPL-SCNC: 3.6 MMOL/L (ref 3.6–5.5)
PROT SERPL-MCNC: 6.5 G/DL (ref 6–8.2)
RBC # BLD AUTO: 3.79 M/UL (ref 4.2–5.4)
SODIUM SERPL-SCNC: 136 MMOL/L (ref 135–145)
SODIUM SERPL-SCNC: 137 MMOL/L (ref 135–145)
WBC # BLD AUTO: 7.1 K/UL (ref 4.8–10.8)

## 2023-08-01 PROCEDURE — 700102 HCHG RX REV CODE 250 W/ 637 OVERRIDE(OP): Performed by: STUDENT IN AN ORGANIZED HEALTH CARE EDUCATION/TRAINING PROGRAM

## 2023-08-01 PROCEDURE — A9270 NON-COVERED ITEM OR SERVICE: HCPCS | Performed by: STUDENT IN AN ORGANIZED HEALTH CARE EDUCATION/TRAINING PROGRAM

## 2023-08-01 PROCEDURE — 700111 HCHG RX REV CODE 636 W/ 250 OVERRIDE (IP): Mod: JZ | Performed by: STUDENT IN AN ORGANIZED HEALTH CARE EDUCATION/TRAINING PROGRAM

## 2023-08-01 PROCEDURE — 80053 COMPREHEN METABOLIC PANEL: CPT

## 2023-08-01 PROCEDURE — 36415 COLL VENOUS BLD VENIPUNCTURE: CPT

## 2023-08-01 PROCEDURE — 700105 HCHG RX REV CODE 258: Mod: JZ | Performed by: STUDENT IN AN ORGANIZED HEALTH CARE EDUCATION/TRAINING PROGRAM

## 2023-08-01 PROCEDURE — 85027 COMPLETE CBC AUTOMATED: CPT

## 2023-08-01 PROCEDURE — 74177 CT ABD & PELVIS W/CONTRAST: CPT

## 2023-08-01 PROCEDURE — 770001 HCHG ROOM/CARE - MED/SURG/GYN PRIV*

## 2023-08-01 PROCEDURE — 80069 RENAL FUNCTION PANEL: CPT

## 2023-08-01 PROCEDURE — 700117 HCHG RX CONTRAST REV CODE 255: Performed by: STUDENT IN AN ORGANIZED HEALTH CARE EDUCATION/TRAINING PROGRAM

## 2023-08-01 RX ADMIN — PIPERACILLIN AND TAZOBACTAM 3.38 G: 3; .375 INJECTION, POWDER, FOR SOLUTION INTRAVENOUS at 20:28

## 2023-08-01 RX ADMIN — PIPERACILLIN AND TAZOBACTAM 3.38 G: 3; .375 INJECTION, POWDER, FOR SOLUTION INTRAVENOUS at 13:12

## 2023-08-01 RX ADMIN — METOPROLOL SUCCINATE 100 MG: 100 TABLET, EXTENDED RELEASE ORAL at 05:16

## 2023-08-01 RX ADMIN — SODIUM CHLORIDE, POTASSIUM CHLORIDE, SODIUM LACTATE AND CALCIUM CHLORIDE: 600; 310; 30; 20 INJECTION, SOLUTION INTRAVENOUS at 16:47

## 2023-08-01 RX ADMIN — IOHEXOL 100 ML: 350 INJECTION, SOLUTION INTRAVENOUS at 15:00

## 2023-08-01 RX ADMIN — PIPERACILLIN AND TAZOBACTAM 3.38 G: 3; .375 INJECTION, POWDER, FOR SOLUTION INTRAVENOUS at 05:15

## 2023-08-01 RX ADMIN — SODIUM CHLORIDE, POTASSIUM CHLORIDE, SODIUM LACTATE AND CALCIUM CHLORIDE: 600; 310; 30; 20 INJECTION, SOLUTION INTRAVENOUS at 05:13

## 2023-08-01 ASSESSMENT — ENCOUNTER SYMPTOMS
ABDOMINAL PAIN: 0
DEPRESSION: 0
COUGH: 0
FEVER: 0
HEADACHES: 0
DIARRHEA: 0
SHORTNESS OF BREATH: 0
NAUSEA: 0
VOMITING: 0
MYALGIAS: 0
CHILLS: 0
BRUISES/BLEEDS EASILY: 0
CONSTIPATION: 0
DOUBLE VISION: 0

## 2023-08-01 ASSESSMENT — PAIN DESCRIPTION - PAIN TYPE
TYPE: ACUTE PAIN

## 2023-08-01 NOTE — CARE PLAN
"The patient is Watcher - Medium risk of patient condition declining or worsening    Shift Goals  Clinical Goals: IVF, IV antibiotics, patient comfort  Patient Goals: \"get better and go home\"  Family Goals: patient comfort    Progress made toward(s) clinical / shift goals:  Patient was a direct admission from Dr. Chang's office. Patient's admission profile has been completed. IVF and IV antibiotics have been started. Excoriation on patient's lower abdomen has been charted and photographed.     Patient is not progressing towards the following goals: Patient is pending CT.      Problem: Knowledge Deficit - Standard  Goal: Patient and family/care givers will demonstrate understanding of plan of care, disease process/condition, diagnostic tests and medications  Outcome: Progressing         "

## 2023-08-01 NOTE — CARE PLAN
The patient is Stable - Low risk of patient condition declining or worsening    Shift Goals  Clinical Goals: CT scan, antibiotics  Patient Goals: CT scan, go home if possible  Family Goals: patient comfort    Progress made toward(s) clinical / shift goals:  Patient is tolerating current antibiotic regimen. CT with oral and IV contrast is to be completed this afternoon at 1400. Patient is ambulatory and has stable vital signs at this time. Patient denies acute pain issues at this time and is aware of interventions available for her comfort.     Patient is not progressing towards the following goals: Patient has not yet been cleared to discharge.    Problem: Knowledge Deficit - Standard  Goal: Patient and family/care givers will demonstrate understanding of plan of care, disease process/condition, diagnostic tests and medications  Outcome: Progressing

## 2023-08-01 NOTE — PROGRESS NOTES
Report received from Uche Rodriguez RN and care of patient assumed at 0700. Patient was assisted up to restroom at her request. White board has been updated. Patient is tolerating continuous IVF and current antibiotic regimen. Call light is within patient reach.

## 2023-08-01 NOTE — CARE PLAN
Problem: Knowledge Deficit - Standard  Goal: Patient and family/care givers will demonstrate understanding of plan of care, disease process/condition, diagnostic tests and medications  Outcome: Progressing   The patient is Stable - Low risk of patient condition declining or worsening    Shift Goals  Clinical Goals: CT scan, antibiotics, hydration  Patient Goals: CT scan, rest  Family Goals: patient comfort    Progress made toward(s) clinical / shift goals:  POC discussed with the patient and verbalized understanding, still for CT scan, call  bell and personal belongings within reach, given enough rest and sleep.

## 2023-08-01 NOTE — H&P
"Gynecologic Oncology H&P    Date: 8/1/2023    Admitting Physician: Dee Dee Porter P.A.-C.     CC: Cellulitis     HPI: Mrs. Monreal is a pleasant 83 year old female who was referred to our office for evaluation of a pelvic mass and elevated . She was seen for consultation on 4/12/12 and was recommended to have surgical pathological evaluation. She underwent a robotic assisted omentectomy, converted to exploratory laparotomy, total hysterectomy with BSO. Complete omentectomy and bilateral pelvic, aortic and renal lymph node dissection on 4/23/12. 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 She was not recommended any adjuvant treatment and is now being monitored with serial .    She presented on 4/27/21 for an annual cancer surveillance examination. She was last seen on 5/23/2018.   on 4/9/21 is 18.4. In late August she had been experiencing some right sided abdominal pain and underwent an abdominal and pelvic ultrasound, which were negative.     She presented on 5/24/21 for a 4 weeks for re-examination of the movable mass that was palpated at her last visit.  She denied any significant symptoms.     Mrs. Monreal presented on 2/16/23 for an annual cancer surveillance examination and c/o hernia; she had not been seen in office since May 2021. She reported \"gurgling\" in her abdomen at the site of her abdominal hernia, ongoing for 2 weeks. Also reported needing to strain to defecate although her stools remain soft in texture. Has stable LLE secondary to lymphedema since her surgery which is overall improved. Reported that she had not followed up due to COVID-19 pandemic.     She underwent a CT of abd/pelvis on 2/27/23 which showed status post hysterectomy, oophorectomy and there is small pelvic free fluid. Free fluid is nonspecific but given history of ovarian carcinoma, clinical correlation and potential follow-up is " warranted. Moderate colonic diverticulosis without evidence of diverticulitis. There is also a large duodenal diverticulum. Supraumbilical abdominal wall hernias contain small and large bowel but do not cause bowel obstruction/compromise.    Mrs. Monreal presents for review of her CT results. CT of abd/pelvis on 5/22/23 which showed a circumscribed 7.3 cm cystic lesion in the pelvis just above the vaginal cuff, with peripheral soft tissue nodules. It may represent recurrent malignancy/metastasis.   It is well-circumscribed and much larger than the pocket of fluid described on the prior CT study. No other suspicious lesions in the abdomen or pelvis. A 9.2 cm stable periumbilical hernia containing nonobstructed bowel loops. Colonic diverticulosis. She had a Colon[facility] at [facility] 4/18/23 stating it was all normal.    She was counseled about surgery and elected to proceed. She subsequently underwent a XL/debulking/ DAYSI/ SB rsxn/ventral hernia repair on 06/06/23 at [facility] performed by Dr. Chang. 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     Mrs. Monreal presents for a 5.5 week post-operative examination. She had an uneventful post-operative course.      Mrs. Monreal presents for a 7.5 week post-operative examination with concerns of oozing and redness from an abdominal incision. She was doing well until 4 days ago when she noted increased redness to her lower incision. It is firm to the touch. She denies any pain. She denies nausea or vomiting. She has no fever or chills.  She continues to have multiple loose bowel movements a day. She denies diarrhea. She otherwise feels like she is recovering from surgery, her energy has improved and she feels more like her normal self. She denies any vaginal bleeding, discharge, pelvic pain, or leg edema. She states her diet has been good, denies having any loss of appetite or early satiety. She  denies experiencing any shortness of breath or difficulty breathing.     Review of Systems:    Review of Systems   Constitutional:  Negative for chills, fever and malaise/fatigue.   HENT:  Negative for congestion.    Eyes:  Negative for double vision.   Respiratory:  Negative for cough and shortness of breath.    Cardiovascular:  Negative for chest pain and leg swelling.   Gastrointestinal:  Negative for abdominal pain, constipation, diarrhea, nausea and vomiting.   Genitourinary:  Negative for dysuria, frequency and urgency.   Musculoskeletal:  Negative for myalgias.   Skin:  Positive for rash.   Neurological:  Negative for headaches.   Endo/Heme/Allergies:  Does not bruise/bleed easily.   Psychiatric/Behavioral:  Negative for depression.         Past Medical History:   Diagnosis Date    CATARACT     Hypertension        Past Surgical History:   Procedure Laterality Date    HYSTERECTOMY ROBOTIC  4/23/2012    Performed by NAI HUITRON at SURGERY [facility] ORS    LYMPH NODE EXCISION  4/23/2012    Performed by NAI HUITRON at SURGERY [facility] ORS    DEBULKING  4/23/2012    Performed by NAI HUITRON at SURGERY [facility] ORS    ABDOMINAL HYSTERECTOMY TOTAL  4/23/2012    Performed by NAI HUITRON at SURGERY [facility] ORS    OMENTECTOMY  4/23/2012    Performed by NAI HUITRON at SURGERY [facility] ORS    OTHER  1/18/2012    detached retina, vitrectomy left eye, [place]    [facility] APPENDECTOMY  12/29/2010    Dr. Gilmore    US-NEEDLE CORE BX-BREAST PANEL           Current Facility-Administered Medications   Medication Dose Route Frequency Provider Last Rate Last Admin    senna-docusate (Pericolace Or Senokot S) 8.6-50 MG per tablet 2 Tablet  2 Tablet Oral BID Dee Dee THEO Chambers.A.-C.        And    polyethylene glycol/lytes (Miralax) PACKET 1 Packet  1 Packet Oral QDAY PRN Dee Dee WILY Porter P.A.-C.        And    magnesium hydroxide (Milk Of Magnesia) suspension 30 mL  30 mL Oral QDAY PRN Dee Dee THEO Chambers.A.-C.         And    bisacodyl (Dulcolax) suppository 10 mg  10 mg Rectal QDAY PRN THEO Schmitz.A.-C.        lactated ringers infusion   Intravenous Continuous THEO Schmitz.JONATHON.-C. 83 mL/hr at 08/01/23 0513 New Bag at 08/01/23 0513    piperacillin-tazobactam (Zosyn) 3.375 g in  mL IVPB  3.375 g Intravenous Q8HRS THEO Schmitz.A.-C.   Stopped at 08/01/23 0915    ondansetron (Zofran) syringe/vial injection 4 mg  4 mg Intravenous Q6HRS PRN THEO Schmitz.A.-C.        prochlorperazine (Compazine) injection 10 mg  10 mg Intravenous Q6HRS PRN THEO Schmitz.A.-C.        acetaminophen (Tylenol) tablet 650 mg  650 mg Oral Q4HRS PRN THEO Schmitz.A.-C.        oxyCODONE-acetaminophen (Percocet) 5-325 MG per tablet 1 Tablet  1 Tablet Oral Q6HRS PRN THEO Schmitz.A.-C.        metoprolol SR (Toprol XL) tablet 100 mg  100 mg Oral DAILY THEO Schmitz.A.-C.   100 mg at 08/01/23 0516    potassium chloride ER (Klor-Con) tablet 10 mEq  10 mEq Oral DAILY THEO Schmitz.A.-C.        valsartan (Diovan) tablet 160 mg  160 mg Oral Q DAY THOE Schmitz.A.-C.        And    hydroCHLOROthiazide (Hydrodiuril) tablet 12.5 mg  12.5 mg Oral Q DAY Dee Dee Porter P.A.-C.           Allergies:  Seasonal    Social History     Socioeconomic History    Marital status:      Spouse name: Not on file    Number of children: Not on file    Years of education: Not on file    Highest education level: Not on file   Occupational History    Not on file   Tobacco Use    Smoking status: Never    Smokeless tobacco: Never   Substance and Sexual Activity    Alcohol use: Yes     Comment: a glass of wine once in awhile    Drug use: No    Sexual activity: Never   Other Topics Concern    Primary/coprimary nurse & associates Not Asked    Family contact information Yes     Comment: Simone [phone] cell    OK to release patient information to the following Not Asked    Patient preferred routine/Privacy concerns Not  "Asked    Patient likes and dislikes Not Asked    Participating In Research Study Not Asked    Miscellaneous Not Asked   Social History Narrative    Not on file     Social Determinants of Health     Financial Resource Strain: Not on file   Food Insecurity: Not on file   Transportation Needs: Not on file   Physical Activity: Not on file   Stress: Not on file   Social Connections: Not on file   Intimate Partner Violence: Not on file   Housing Stability: Not on file       No family history on file.      Physical Exam:  BP (!) 144/81   Pulse 77   Temp 36.3 °C (97.4 °F) (Temporal)   Resp 16   Ht 1.6 m (5' 2.99\")   Wt 62.1 kg (136 lb 14.5 oz)   SpO2 93%     Constitutional: she is oriented to person, place, and time.  she appears well-developed and well-nourished. No distress.   Head: Normocephalic and atraumatic.   Neck: Normal range of motion. Neck supple. No JVD present. No tracheal deviation present. No thyromegaly present.   Cardiovascular: Normal rate, regular rhythm, normal heart sounds and intact distal pulses.  Exam reveals no gallop and no friction rub.  No murmur heard.  Pulmonary/Chest: Effort normal and breath sounds normal. No stridor. No respiratory distress. she has no wheezes. She has no rales.   Abdominal: Soft. Bowel sounds are normal. she exhibits no distension and no mass. There is no tenderness. There is no rebound and no guarding.   Musculoskeletal: Normal range of motion. she exhibits no edema and no tenderness.   Neurological: she is alert and oriented to person, place, and time. she has normal reflexes. No cranial nerve deficit. Coordination normal.   Skin: Skin is warm and dry. No rash noted. she is not diaphoretic. No erythema. No pallor.   Psychiatric: she has a normal mood and affect.  Behavior is normal.     Pelvic: Normal external female genitalia, no lesions, normal BUS. Speculum exam reveals smooth vaginal mucosa, well estrogenized/atrophic, with/no bleeding, discharge or lesions. " Cervix smooth, nulliparous/multiparous, no gross lesions. Uterus midline, ante/retroverted, ~ _ weeks size, mobile, non-tender. No palpable adnexal mass. Rectovaginal exam reveals smooth rectovaginal septum, no cul de sac nodularity, bilateral parametria/pelvic sidewalls free.      Physical Exam  Constitutional:       General: She is not in acute distress.     Appearance: She is not ill-appearing or toxic-appearing.   HENT:      Head: Normocephalic.      Mouth/Throat:      Mouth: Mucous membranes are moist.   Eyes:      Pupils: Pupils are equal, round, and reactive to light.   Cardiovascular:      Rate and Rhythm: Normal rate and regular rhythm.      Pulses: Normal pulses.      Heart sounds: Normal heart sounds.   Pulmonary:      Effort: Pulmonary effort is normal.   Abdominal:      General: Abdomen is flat. Bowel sounds are normal. There is no distension.      Palpations: Abdomen is soft. There is no mass.      Tenderness: There is no abdominal tenderness.      Comments: Approximately 7 cm x 3 cm area of erythema at inferior pole of otherwise well healed MLVI, induration noted, no fluctuance, blistering to interior of redness, no active drainage. Non tender to palpation.    Genitourinary:     Vagina: No vaginal discharge.   Musculoskeletal:         General: Normal range of motion.      Right lower leg: No edema.      Left lower leg: No edema.   Skin:     General: Skin is warm and dry.   Neurological:      Mental Status: She is alert and oriented to person, place, and time.          Labs:  Recent Labs     07/31/23  2305 08/01/23  0429   WBC 6.8 7.1   RBC 3.59* 3.79*   HEMOGLOBIN 11.2* 11.7*   HEMATOCRIT 33.3* 35.1*   MCV 92.8 92.6   MCH 31.2 30.9   MCHC 33.6 33.3   RDW 44.2 43.7   PLATELETCT 237 244   MPV 8.7* 8.9*     Recent Labs     08/01/23  0429   SODIUM 136  137   POTASSIUM 3.5*  3.6   CHLORIDE 103  104   CO2 23  24   GLUCOSE 102*  105*   BUN 6*  5*   CREATININE 0.52  0.54   CALCIUM 8.9  8.6          Recent Labs     08/01/23  0429   ASTSGOT 11*   ALTSGPT 7   TBILIRUBIN 0.6   ALKPHOSPHAT 65   GLOBULIN 3.3           Assessment: This is a 83 y.o. 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 Found to have abdominal wound cellulitis, admitted for furhter evaluation and IV antibiotics.     Plan:   Cellulitis: to lower abdominal incision with induration, concerning for possible underlying abscess. CT abd/pelvis to evaluate further. Start IV Zosyn.   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   Hx HTN: on home meds.   GI/FEN: regular diet, monitor lytes and replace prn   Ppx: SCDs, ambulation     Case and plan discussed with Dr. Bernardo Porter, PA-C  Gynecologic Oncology  [facility]

## 2023-08-01 NOTE — PROGRESS NOTES
4 Eyes Skin Assessment Completed by DUY RUBIN and DUY Poon.    Head WDL  Ears WDL  Nose WDL  Mouth WDL  Neck WDL  Breast/Chest WDL  Shoulder Blades WDL  Spine WDL  (R) Arm/Elbow/Hand WDL  (L) Arm/Elbow/Hand WDL  Abdomen Redness and Non-Blanching, pinkish to reddish with excoriation- post op abdominal cellulitis      Groin WDL  Scrotum/Coccyx/Buttocks WDL  (R) Leg Swelling generalized  (L) Leg Swelling  generalized  (R) Heel/Foot/Toe Swelling  generalized  (L) Heel/Foot/Toe Swelling  generalized          Devices In Places Blood Pressure Cuff and Pulse Ox, PIV x 1      Interventions In Place Pillows, Dri-Fidel Pads, and Heels Loaded W/Pillows    Possible Skin Injury Yes    Pictures Uploaded Into Epic Yes  Wound Consult Placed N/A  RN Wound Prevention Protocol Ordered No

## 2023-08-02 PROCEDURE — A9270 NON-COVERED ITEM OR SERVICE: HCPCS | Performed by: STUDENT IN AN ORGANIZED HEALTH CARE EDUCATION/TRAINING PROGRAM

## 2023-08-02 PROCEDURE — 700111 HCHG RX REV CODE 636 W/ 250 OVERRIDE (IP): Mod: JZ | Performed by: STUDENT IN AN ORGANIZED HEALTH CARE EDUCATION/TRAINING PROGRAM

## 2023-08-02 PROCEDURE — 700105 HCHG RX REV CODE 258: Mod: JZ | Performed by: STUDENT IN AN ORGANIZED HEALTH CARE EDUCATION/TRAINING PROGRAM

## 2023-08-02 PROCEDURE — 770001 HCHG ROOM/CARE - MED/SURG/GYN PRIV*

## 2023-08-02 PROCEDURE — 700102 HCHG RX REV CODE 250 W/ 637 OVERRIDE(OP): Performed by: STUDENT IN AN ORGANIZED HEALTH CARE EDUCATION/TRAINING PROGRAM

## 2023-08-02 RX ADMIN — METOPROLOL SUCCINATE 100 MG: 100 TABLET, EXTENDED RELEASE ORAL at 04:34

## 2023-08-02 RX ADMIN — PIPERACILLIN AND TAZOBACTAM 3.38 G: 3; .375 INJECTION, POWDER, FOR SOLUTION INTRAVENOUS at 04:36

## 2023-08-02 RX ADMIN — PIPERACILLIN AND TAZOBACTAM 3.38 G: 3; .375 INJECTION, POWDER, FOR SOLUTION INTRAVENOUS at 12:34

## 2023-08-02 RX ADMIN — SODIUM CHLORIDE, POTASSIUM CHLORIDE, SODIUM LACTATE AND CALCIUM CHLORIDE: 600; 310; 30; 20 INJECTION, SOLUTION INTRAVENOUS at 01:18

## 2023-08-02 RX ADMIN — PIPERACILLIN AND TAZOBACTAM 3.38 G: 3; .375 INJECTION, POWDER, FOR SOLUTION INTRAVENOUS at 20:58

## 2023-08-02 ASSESSMENT — PAIN DESCRIPTION - PAIN TYPE
TYPE: ACUTE PAIN;SURGICAL PAIN
TYPE: ACUTE PAIN
TYPE: ACUTE PAIN;SURGICAL PAIN
TYPE: ACUTE PAIN

## 2023-08-02 ASSESSMENT — ENCOUNTER SYMPTOMS
DIARRHEA: 0
FEVER: 0
CHILLS: 0
NAUSEA: 0
VOMITING: 0
SHORTNESS OF BREATH: 0
CONSTIPATION: 0
PALPITATIONS: 0
ABDOMINAL PAIN: 0
COUGH: 0

## 2023-08-02 ASSESSMENT — PATIENT HEALTH QUESTIONNAIRE - PHQ9
SUM OF ALL RESPONSES TO PHQ9 QUESTIONS 1 AND 2: 0
1. LITTLE INTEREST OR PLEASURE IN DOING THINGS: NOT AT ALL

## 2023-08-02 NOTE — CARE PLAN
The patient is Stable - Low risk of patient condition declining or worsening    Shift Goals  Clinical Goals: Abx/ Ambulation  Patient Goals: Ambulation  Family Goals: patient comfort    Progress made toward(s) clinical / shift goals:        Problem: Knowledge Deficit - Standard  Goal: Patient and family/care givers will demonstrate understanding of plan of care, disease process/condition, diagnostic tests and medications  Outcome: Progressing

## 2023-08-02 NOTE — PROGRESS NOTES
Bedside report received, assessment completed    A&O x  4, pt calls appropriately  Mobility: Up self, no assistive devices needed   Fall Risk Assessment: low, bed alarm n/a, door notifications n/a  Pain Assessment / Reassessment completed, medication provided per MAR  Diet: Regular, tolerating   LDA:   IV Access: 20G R/L wrist, CDI/ flushed/ Infusing Abx & LR  GI/: + void, + flatus, 8/2 BM  DVT Prophylaxis: SCD on while in bed   - ambulation encouraged x3, up to chair for all meals   Gene Score: 22, Interventions per flow sheet  Procedures:    - n/a   D/C Plan:    - Abx mgt of cellulitis    - No additional needs upon discharge     Reviewed plan of care with patient, bed in lowest position and locked, pt resting comfortably now, call light within reach, all needs met at this time. Interventions will be executed per plan of care

## 2023-08-02 NOTE — PROGRESS NOTES
"Gynecologic Oncology Progress Note    Author: MARKIE Fishman    Date/Time: 8/2/2023 11:58 AM    Date of Admit: 7/31/2023    HD#: 2     Interval History:  She states that she is feeling well and has no complaints. She is \"getting cabin fever.\" Denies any pain, n/v, or problems with urinary or bowel function. Feels that the antibiotics are helping and redness and firmness is improving.       Review of Systems   Constitutional:  Negative for chills and fever.   Respiratory:  Negative for cough and shortness of breath.    Cardiovascular:  Negative for chest pain and palpitations.   Gastrointestinal:  Negative for abdominal pain, constipation, diarrhea, nausea and vomiting.   Genitourinary:  Negative for dysuria, frequency, hematuria and urgency.            Allergies   Allergen Reactions    Seasonal Runny Nose     Sneezing, runny nose            Current Facility-Administered Medications:     senna-docusate (Pericolace Or Senokot S) 8.6-50 MG per tablet 2 Tablet, 2 Tablet, Oral, BID **AND** polyethylene glycol/lytes (Miralax) PACKET 1 Packet, 1 Packet, Oral, QDAY PRN **AND** magnesium hydroxide (Milk Of Magnesia) suspension 30 mL, 30 mL, Oral, QDAY PRN **AND** bisacodyl (Dulcolax) suppository 10 mg, 10 mg, Rectal, QDAY PRN, Dee Dee Porter, P.A.-C.    lactated ringers infusion, , Intravenous, Continuous, THEO Schmitz.A.-C., Last Rate: 83 mL/hr at 08/02/23 0118, New Bag at 08/02/23 0118    [COMPLETED] piperacillin-tazobactam (Zosyn) 3.375 g in  mL IVPB, 3.375 g, Intravenous, Once, Stopped at 07/31/23 1810 **AND** piperacillin-tazobactam (Zosyn) 3.375 g in  mL IVPB, 3.375 g, Intravenous, Q8HRS, THEO Schmitz.A.-C., Stopped at 08/02/23 0836    ondansetron (Zofran) syringe/vial injection 4 mg, 4 mg, Intravenous, Q6HRS PRN, Dee Dee Porter P.A.-C.    prochlorperazine (Compazine) injection 10 mg, 10 mg, Intravenous, Q6HRS PRN, Dee Dee Porter P.A.-C.    acetaminophen (Tylenol) tablet 650 " "mg, 650 mg, Oral, Q4HRS PRN, JESSE SchmitzA.-C.    oxyCODONE-acetaminophen (Percocet) 5-325 MG per tablet 1 Tablet, 1 Tablet, Oral, Q6HRS PRN, THEO Schmitz.A.-C.    metoprolol SR (Toprol XL) tablet 100 mg, 100 mg, Oral, DAILY, THEO Schmitz.A.-C., 100 mg at 08/02/23 0434    potassium chloride ER (Klor-Con) tablet 10 mEq, 10 mEq, Oral, DAILY, THEO Schmitz.A.-C.    valsartan (Diovan) tablet 160 mg, 160 mg, Oral, Q DAY **AND** hydroCHLOROthiazide (Hydrodiuril) tablet 12.5 mg, 12.5 mg, Oral, Q DAY, THEO Schmitz.A.-C.       Objective:     BP (!) 140/81   Pulse 70   Temp 36.5 °C (97.7 °F) (Temporal)   Resp 18   Ht 1.6 m (5' 2.99\")   Wt 62.1 kg (136 lb 14.5 oz)   SpO2 92%     Physical Exam  Constitutional:       Appearance: Normal appearance.   HENT:      Head: Normocephalic and atraumatic.   Eyes:      Extraocular Movements: Extraocular movements intact.   Cardiovascular:      Heart sounds: Normal heart sounds.   Pulmonary:      Effort: Pulmonary effort is normal.      Breath sounds: Normal breath sounds.   Abdominal:      General: Abdomen is flat. There is no distension.      Tenderness: There is no abdominal tenderness.      Comments: Lower midline area with erythema and indurated. Scaly. No drainage.   Musculoskeletal:      Cervical back: Normal range of motion and neck supple.   Skin:     General: Skin is warm and dry.   Neurological:      Mental Status: She is alert and oriented to person, place, and time.         Recent Labs     07/31/23  2305 08/01/23  0429   WBC 6.8 7.1   RBC 3.59* 3.79*   HEMOGLOBIN 11.2* 11.7*   HEMATOCRIT 33.3* 35.1*   MCV 92.8 92.6   MCH 31.2 30.9   MCHC 33.6 33.3   RDW 44.2 43.7   PLATELETCT 237 244   MPV 8.7* 8.9*     Recent Labs     08/01/23 0429   SODIUM 136  137   POTASSIUM 3.5*  3.6   CHLORIDE 103  104   CO2 23  24   GLUCOSE 102*  105*   BUN 6*  5*   CREATININE 0.52  0.54   CALCIUM 8.9  8.6     Recent Labs     08/01/23 0429   ASTSGOT 11* "   ALTSGPT 7   TBILIRUBIN 0.6   ALKPHOSPHAT 65   GLOBULIN 3.3          Radiology  8/1/23 CT A/P  IMPRESSION:     1.  Linear pocket of subcutaneous fluid deep to the midline abdominal scar measuring 4.4 x 3.6 x 1.1 cm, at the site of prior hernia repair. It is not definitively infected on CT.  2.  Several prominent inguinal lymph nodes, most likely reactive.  3.  No acute inflammatory process in the abdomen or pelvis.  4.  Prior omentectomy and resection of pelvic mass. No metastatic disease in the abdomen or pelvis.  5.  Colonic diverticulosis.       Assessment: This is a 83 y.o. 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 Found to have abdominal wound cellulitis, admitted for furhter evaluation and IV antibiotics.      Plan:   Cellulitis: to lower abdominal incision with induration. Day #2 of IV Zosyn. CT shows no evidence of abscess but does show subq fluid collection. Per Dr. Chang, will plan on drainage tomorrow at bedside.   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   Hx HTN: on home meds.   GI/FEN: regular diet, monitor lytes and replace prn   Ppx: SCDs, ambulation     This case was discussed with Dr. Chang.    Michelle Thompson PEMBER.  Gynecology Oncology  [facility]

## 2023-08-02 NOTE — CARE PLAN
Problem: Knowledge Deficit - Standard  Goal: Patient and family/care givers will demonstrate understanding of plan of care, disease process/condition, diagnostic tests and medications  Outcome: Progressing   The patient is Stable - Low risk of patient condition declining or worsening    Shift Goals  Clinical Goals: antibiotics, rest  Patient Goals: rest  Family Goals: patient comfort    Progress made toward(s) clinical / shift goals:  POC discussed with the patient and verbalized understanding, call bell and personal  belongings within reach, given enough rest and sleep.

## 2023-08-03 PROCEDURE — 770001 HCHG ROOM/CARE - MED/SURG/GYN PRIV*

## 2023-08-03 PROCEDURE — 700111 HCHG RX REV CODE 636 W/ 250 OVERRIDE (IP): Mod: JZ | Performed by: STUDENT IN AN ORGANIZED HEALTH CARE EDUCATION/TRAINING PROGRAM

## 2023-08-03 PROCEDURE — A9270 NON-COVERED ITEM OR SERVICE: HCPCS | Performed by: STUDENT IN AN ORGANIZED HEALTH CARE EDUCATION/TRAINING PROGRAM

## 2023-08-03 PROCEDURE — 700105 HCHG RX REV CODE 258: Performed by: STUDENT IN AN ORGANIZED HEALTH CARE EDUCATION/TRAINING PROGRAM

## 2023-08-03 PROCEDURE — 700102 HCHG RX REV CODE 250 W/ 637 OVERRIDE(OP): Performed by: STUDENT IN AN ORGANIZED HEALTH CARE EDUCATION/TRAINING PROGRAM

## 2023-08-03 RX ORDER — SULFAMETHOXAZOLE AND TRIMETHOPRIM 800; 160 MG/1; MG/1
1 TABLET ORAL EVERY 12 HOURS
Status: DISCONTINUED | OUTPATIENT
Start: 2023-08-03 | End: 2023-08-04 | Stop reason: HOSPADM

## 2023-08-03 RX ADMIN — POTASSIUM CHLORIDE 10 MEQ: 750 TABLET, EXTENDED RELEASE ORAL at 11:41

## 2023-08-03 RX ADMIN — SULFAMETHOXAZOLE AND TRIMETHOPRIM 1 TABLET: 800; 160 TABLET ORAL at 19:52

## 2023-08-03 RX ADMIN — PIPERACILLIN AND TAZOBACTAM 3.38 G: 3; .375 INJECTION, POWDER, FOR SOLUTION INTRAVENOUS at 20:59

## 2023-08-03 RX ADMIN — PIPERACILLIN AND TAZOBACTAM 3.38 G: 3; .375 INJECTION, POWDER, FOR SOLUTION INTRAVENOUS at 13:51

## 2023-08-03 RX ADMIN — METOPROLOL SUCCINATE 100 MG: 100 TABLET, EXTENDED RELEASE ORAL at 05:03

## 2023-08-03 RX ADMIN — PIPERACILLIN AND TAZOBACTAM 3.38 G: 3; .375 INJECTION, POWDER, FOR SOLUTION INTRAVENOUS at 05:05

## 2023-08-03 ASSESSMENT — ENCOUNTER SYMPTOMS
CHILLS: 0
DIARRHEA: 0
CONSTIPATION: 0
PALPITATIONS: 0
ABDOMINAL PAIN: 0
COUGH: 0
SHORTNESS OF BREATH: 0
VOMITING: 0
NAUSEA: 0
FEVER: 0

## 2023-08-03 NOTE — PROGRESS NOTES
Pt AO x 4  Vitals signs stable  Pt denies chest pain or SOB  O2 sat >90% on RA breathing unlabored    Pt denies pain  Pt denies N/V/D  Pt is tolerating regular diet  Redness to abd, healing abd incision.   + voiding  + flatus, last BM on 8/2, Bowel sounds present  Pt ambulates self, standby at night       Plan of care discussed with pt. Safety education done. Falls precautions in place. Call light and belongings within reach. All needs met at this time.

## 2023-08-03 NOTE — CARE PLAN
The patient is Stable - Low risk of patient condition declining or worsening    Shift Goals  Clinical Goals: Abx administration, Rest  Patient Goals: Ambulation  Family Goals: patient comfort    Progress made toward(s) clinical / shift goals: Q8 IV zosyn administered    Patient is not progressing towards the following goals:

## 2023-08-03 NOTE — PROGRESS NOTES
Bedside report received.  Assessment complete.  A&O x 4. Patient calls appropriately.  Patient ambulates with SB assist.    Patient has 0/10 pain. Pain managed with prescribed medications.  Denies N&V. Tolerating regular diet.  Lower abdomen with erythema/flaking skin, MIA  + void, + flatus, + BM.  Patient denies SOB.  SCD's refused.    Review plan with of care with patient. Call light and personal belongings within reach. Hourly rounding in place. All needs met at this time.

## 2023-08-04 VITALS
TEMPERATURE: 97.3 F | HEIGHT: 63 IN | DIASTOLIC BLOOD PRESSURE: 64 MMHG | BODY MASS INDEX: 24.26 KG/M2 | OXYGEN SATURATION: 91 % | WEIGHT: 136.91 LBS | RESPIRATION RATE: 18 BRPM | SYSTOLIC BLOOD PRESSURE: 124 MMHG | HEART RATE: 67 BPM

## 2023-08-04 LAB
ANION GAP SERPL CALC-SCNC: 8 MMOL/L (ref 7–16)
BASOPHILS # BLD AUTO: 0.8 % (ref 0–1.8)
BASOPHILS # BLD: 0.06 K/UL (ref 0–0.12)
BUN SERPL-MCNC: 7 MG/DL (ref 8–22)
CALCIUM SERPL-MCNC: 8.7 MG/DL (ref 8.5–10.5)
CHLORIDE SERPL-SCNC: 105 MMOL/L (ref 96–112)
CO2 SERPL-SCNC: 25 MMOL/L (ref 20–33)
CREAT SERPL-MCNC: 0.77 MG/DL (ref 0.5–1.4)
EOSINOPHIL # BLD AUTO: 0.33 K/UL (ref 0–0.51)
EOSINOPHIL NFR BLD: 4.3 % (ref 0–6.9)
ERYTHROCYTE [DISTWIDTH] IN BLOOD BY AUTOMATED COUNT: 44.3 FL (ref 35.9–50)
GFR SERPLBLD CREATININE-BSD FMLA CKD-EPI: 75 ML/MIN/1.73 M 2
GLUCOSE SERPL-MCNC: 92 MG/DL (ref 65–99)
HCT VFR BLD AUTO: 34.7 % (ref 37–47)
HGB BLD-MCNC: 11.4 G/DL (ref 12–16)
IMM GRANULOCYTES # BLD AUTO: 0.02 K/UL (ref 0–0.11)
IMM GRANULOCYTES NFR BLD AUTO: 0.3 % (ref 0–0.9)
LYMPHOCYTES # BLD AUTO: 1.8 K/UL (ref 1–4.8)
LYMPHOCYTES NFR BLD: 23.7 % (ref 22–41)
MCH RBC QN AUTO: 30.8 PG (ref 27–33)
MCHC RBC AUTO-ENTMCNC: 32.9 G/DL (ref 32.2–35.5)
MCV RBC AUTO: 93.8 FL (ref 81.4–97.8)
MONOCYTES # BLD AUTO: 0.78 K/UL (ref 0–0.85)
MONOCYTES NFR BLD AUTO: 10.2 % (ref 0–13.4)
NEUTROPHILS # BLD AUTO: 4.62 K/UL (ref 1.82–7.42)
NEUTROPHILS NFR BLD: 60.7 % (ref 44–72)
NRBC # BLD AUTO: 0 K/UL
NRBC BLD-RTO: 0 /100 WBC (ref 0–0.2)
PLATELET # BLD AUTO: 226 K/UL (ref 164–446)
PMV BLD AUTO: 8.7 FL (ref 9–12.9)
POTASSIUM SERPL-SCNC: 3.5 MMOL/L (ref 3.6–5.5)
RBC # BLD AUTO: 3.7 M/UL (ref 4.2–5.4)
SODIUM SERPL-SCNC: 138 MMOL/L (ref 135–145)
WBC # BLD AUTO: 7.6 K/UL (ref 4.8–10.8)

## 2023-08-04 PROCEDURE — 700111 HCHG RX REV CODE 636 W/ 250 OVERRIDE (IP): Mod: JZ | Performed by: STUDENT IN AN ORGANIZED HEALTH CARE EDUCATION/TRAINING PROGRAM

## 2023-08-04 PROCEDURE — A9270 NON-COVERED ITEM OR SERVICE: HCPCS | Performed by: STUDENT IN AN ORGANIZED HEALTH CARE EDUCATION/TRAINING PROGRAM

## 2023-08-04 PROCEDURE — 80048 BASIC METABOLIC PNL TOTAL CA: CPT

## 2023-08-04 PROCEDURE — 700102 HCHG RX REV CODE 250 W/ 637 OVERRIDE(OP): Performed by: STUDENT IN AN ORGANIZED HEALTH CARE EDUCATION/TRAINING PROGRAM

## 2023-08-04 PROCEDURE — 85025 COMPLETE CBC W/AUTO DIFF WBC: CPT

## 2023-08-04 PROCEDURE — 700105 HCHG RX REV CODE 258: Performed by: STUDENT IN AN ORGANIZED HEALTH CARE EDUCATION/TRAINING PROGRAM

## 2023-08-04 RX ORDER — POTASSIUM CHLORIDE 20 MEQ/1
20 TABLET, EXTENDED RELEASE ORAL ONCE
Status: COMPLETED | OUTPATIENT
Start: 2023-08-04 | End: 2023-08-04

## 2023-08-04 RX ADMIN — METOPROLOL SUCCINATE 100 MG: 100 TABLET, EXTENDED RELEASE ORAL at 05:15

## 2023-08-04 RX ADMIN — SULFAMETHOXAZOLE AND TRIMETHOPRIM 1 TABLET: 800; 160 TABLET ORAL at 05:14

## 2023-08-04 RX ADMIN — POTASSIUM CHLORIDE 10 MEQ: 750 TABLET, EXTENDED RELEASE ORAL at 05:15

## 2023-08-04 RX ADMIN — POTASSIUM CHLORIDE 20 MEQ: 1500 TABLET, EXTENDED RELEASE ORAL at 11:13

## 2023-08-04 RX ADMIN — PIPERACILLIN AND TAZOBACTAM 3.38 G: 3; .375 INJECTION, POWDER, FOR SOLUTION INTRAVENOUS at 04:42

## 2023-08-04 NOTE — DISCHARGE INSTRUCTIONS
Take Bactrim twice a day for 14 days  Call [phone] if you notice any increase redness to your abdomen, fever,chills, nausea, vomiting or you feel unwell.   We will call you with an appointment in two weeks

## 2023-08-04 NOTE — PROGRESS NOTES
Gynecologic Oncology Progress Note    Author: LOBITO Bell   Date/Time: 8/3/2023 6:39 PM    Date of Admit: 7/31/2023    HD#: 3     Interval History:  No acute overnight events. Doing well, resting comfortably in bed with family at bedside. Denies pain, no fever or chills. Redness to abdomen improving. Voiding. No n/v.        Review of Systems   Constitutional:  Negative for chills and fever.   Respiratory:  Negative for cough and shortness of breath.    Cardiovascular:  Negative for chest pain and palpitations.   Gastrointestinal:  Negative for abdominal pain, constipation, diarrhea, nausea and vomiting.   Genitourinary:  Negative for dysuria, frequency, hematuria and urgency.            Allergies   Allergen Reactions    Seasonal Runny Nose     Sneezing, runny nose            Current Facility-Administered Medications:     sulfamethoxazole-trimethoprim (Bactrim DS) 800-160 MG tablet 1 Tablet, 1 Tablet, Oral, Q12HRS, Dee Dee Porter P.A.-C.    senna-docusate (Pericolace Or Senokot S) 8.6-50 MG per tablet 2 Tablet, 2 Tablet, Oral, BID **AND** polyethylene glycol/lytes (Miralax) PACKET 1 Packet, 1 Packet, Oral, QDAY PRN **AND** magnesium hydroxide (Milk Of Magnesia) suspension 30 mL, 30 mL, Oral, QDAY PRN **AND** bisacodyl (Dulcolax) suppository 10 mg, 10 mg, Rectal, QDAY PRN, Dee Dee Porter P.A.-C.    lactated ringers infusion, , Intravenous, Continuous, Dee Dee Porter P.A.-C., Last Rate: 83 mL/hr at 08/02/23 0118, New Bag at 08/02/23 0118    [COMPLETED] piperacillin-tazobactam (Zosyn) 3.375 g in  mL IVPB, 3.375 g, Intravenous, Once, Stopped at 07/31/23 1810 **AND** piperacillin-tazobactam (Zosyn) 3.375 g in  mL IVPB, 3.375 g, Intravenous, Q8HRS, Dee Dee F Porter, P.A.-C., Last Rate: 25 mL/hr at 08/03/23 1351, 3.375 g at 08/03/23 1351    ondansetron (Zofran) syringe/vial injection 4 mg, 4 mg, Intravenous, Q6HRS PRN, LOBITO SchmitzCRosalie    prochlorperazine (Compazine) injection 10  "mg, 10 mg, Intravenous, Q6HRS PRN, THEO Schmitz.A.-C.    acetaminophen (Tylenol) tablet 650 mg, 650 mg, Oral, Q4HRS PRN, THEO Schmitz.A.-C.    oxyCODONE-acetaminophen (Percocet) 5-325 MG per tablet 1 Tablet, 1 Tablet, Oral, Q6HRS PRN, THEO Schmitz.A.-C.    metoprolol SR (Toprol XL) tablet 100 mg, 100 mg, Oral, DAILY, THEO Schmitz.A.-C., 100 mg at 08/03/23 0503    potassium chloride ER (Klor-Con) tablet 10 mEq, 10 mEq, Oral, DAILY, THEO Schmitz.A.-C., 10 mEq at 08/03/23 1141    valsartan (Diovan) tablet 160 mg, 160 mg, Oral, Q DAY **AND** hydroCHLOROthiazide (Hydrodiuril) tablet 12.5 mg, 12.5 mg, Oral, Q DAY, THEO Schmitz.A.-C.       Objective:     /68   Pulse 65   Temp 36.3 °C (97.3 °F) (Temporal)   Resp 16   Ht 1.6 m (5' 2.99\")   Wt 62.1 kg (136 lb 14.5 oz)   SpO2 96%     Physical Exam  Constitutional:       Appearance: Normal appearance.   HENT:      Head: Normocephalic and atraumatic.   Eyes:      Extraocular Movements: Extraocular movements intact.   Cardiovascular:      Heart sounds: Normal heart sounds.   Pulmonary:      Effort: Pulmonary effort is normal.      Breath sounds: Normal breath sounds.   Abdominal:      General: Abdomen is flat. There is no distension.      Tenderness: There is no abdominal tenderness.      Comments: Lower midline area with erythema and indurated, improving, scaly, no drainage.   Musculoskeletal:      Cervical back: Normal range of motion and neck supple.   Skin:     General: Skin is warm and dry.   Neurological:      Mental Status: She is alert and oriented to person, place, and time.         Recent Labs     07/31/23  2305 08/01/23  0429   WBC 6.8 7.1   RBC 3.59* 3.79*   HEMOGLOBIN 11.2* 11.7*   HEMATOCRIT 33.3* 35.1*   MCV 92.8 92.6   MCH 31.2 30.9   MCHC 33.6 33.3   RDW 44.2 43.7   PLATELETCT 237 244   MPV 8.7* 8.9*       Recent Labs     08/01/23  0429   SODIUM 136  137   POTASSIUM 3.5*  3.6   CHLORIDE 103  104   CO2 23  " 24   GLUCOSE 102*  105*   BUN 6*  5*   CREATININE 0.52  0.54   CALCIUM 8.9  8.6       Recent Labs     08/01/23  0429   ASTSGOT 11*   ALTSGPT 7   TBILIRUBIN 0.6   ALKPHOSPHAT 65   GLOBULIN 3.3            Radiology  8/1/23 CT A/P  IMPRESSION:     1.  Linear pocket of subcutaneous fluid deep to the midline abdominal scar measuring 4.4 x 3.6 x 1.1 cm, at the site of prior hernia repair. It is not definitively infected on CT.  2.  Several prominent inguinal lymph nodes, most likely reactive.  3.  No acute inflammatory process in the abdomen or pelvis.  4.  Prior omentectomy and resection of pelvic mass. No metastatic disease in the abdomen or pelvis.  5.  Colonic diverticulosis.       Assessment: This is a 83 y.o. 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 Found to have abdominal wound cellulitis, admitted for furhter evaluation and IV antibiotics.      Plan:   Cellulitis: to lower abdominal incision with induration. Day #3 of IV Zosyn. CT shows no evidence of abscess but does show small subq fluid collection. Erythema and induration much improved. No plan for drainage. Will start Bactrim tonight, will dc Zosyn tomorrow. Plan for dc home tomorrow with oral antibiotics.   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   Hx HTN: on home meds.   GI/FEN: regular diet, monitor lytes and replace prn   Ppx: SCDs, ambulation   Dispo: continue inpatient management of cellulitis, likely discharge tomorrow.     Patient seen with Dr. Chang.    Dee Dee Porter, P.A. - MEIR  Gynecology Oncology  Center [facility]

## 2023-08-04 NOTE — CARE PLAN
The patient is Stable - Low risk of patient condition declining or worsening    Shift Goals  Clinical Goals: abx administration  Patient Goals: go home in AM  Family Goals: patient comfort    Progress made toward(s) clinical / shift goals:    Problem: Knowledge Deficit - Standard  Goal: Patient and family/care givers will demonstrate understanding of plan of care, disease process/condition, diagnostic tests and medications  Description: Target End Date:  1-3 days or as soon as patient condition allows    Document in Patient Education    1.  Patient and family/caregiver oriented to unit, equipment, visitation policy and means for communicating concern  2.  Complete/review Learning Assessment  3.  Assess knowledge level of disease process/condition, treatment plan, diagnostic tests and medications  4.  Explain disease process/condition, treatment plan, diagnostic tests and medications  Outcome: Progressing     Problem: Bowel Elimination  Goal: Establish and maintain regular bowel function  Description: Target End Date:  Prior to discharge or change in level of care    1.   Note date of last BM  2.   Educate about diet, fluid intake, medication and activity to promote bowel function  3.   Educate signs and symptoms of constipation and interventions to implement  4.   Pharmacologic bowel management per provider order  5.   Regular toileting schedule  6.   Upright position for toileting  7.   High fiber diet  8.   Encourage hydration  9.   Collaborate with Clinical Dietician  10. Care and maintenance of ostomy if applicable  Outcome: Progressing     Problem: Gastrointestinal Irritability  Goal: Nausea and vomiting will be absent or improve  Description: Target End Date:  Prior to discharge or change in level of care    Document on I/O and Assessment flowsheets    1.  Assess for history, duration, frequency, severity, and potential precipitating factors  2.  Administer antiemetics as ordered  3.  Emesis basin within easy  reach of the patient, but out of sight if psychogenic component  4.  Eliminate strong odors from surroundings  5.  Introduce cold water, ice chips, ginger products, and room temperature broth or bouillon if tolerated and appropriate to the patient's diet  6.  Encourage small amounts of bland, simple foods like broth, rice, bananas, Jell-O, crackers or toast if tolerated and appropriate to patient's diet  7.  Position the patient upright while eating and for 1 to 2 hours post-meal  8.  Encourage nonpharmacological nausea control techniques such as relaxation, guided imagery, music therapy, distraction, or deep breathing exercises  Outcome: Progressing     Problem: Mobility  Goal: Patient's capacity to carry out activities will improve  Description: Target End Date:  Prior to discharge or change in level of care    1.  Assess for barriers to mobility/activity  2.  Implement activity per interdisciplinary team recommendations  3.  Target activity level identified and patient/family/caregiver aware of goal  4.  Provide assistive devices  5.  Instruct patient/caregiver on proper use of assistive/adaptive devices  6.  Schedule activities and rest periods to decrease effects of fatigue  7.  Encourage mobilization to extent of ability  8.  Maintain proper body alignment  9.  Provide adequate pain management to allow progressive mobilization  10. Implement pace maker precautions as needed  Outcome: Progressing     Problem: Self Care  Goal: Patient will have the ability to perform ADLs independently or with assistance (bathe, groom, dress, toilet and feed)  Description: Target End Date:  Prior to discharge or change in level of care    Document on ADL flowsheet    1.  Assess the capability and level of deficiency to perform ADLs  2.  Encourage family/care giver involvement  3.  Provide assistive devices  4.  Consider PT/OT evaluations  5.  Maintain support, give positive feedback, encourage self-care allowing extra time and  verbal cuing as needed  6.  Avoid doing something for patients they can do themselves, but provide assistance as needed  7.  Assist in anticipating/planning individual needs  8.  Collaborate with Case Management and  to meet discharge needs  Outcome: Progressing     Problem: Infection - Standard  Goal: Patient will remain free from infection  Description: Target End Date:  Prior to discharge or change in level of care    1.  Utilize Standard Precautions at all times to reduce the risk of transmission of microorganisms from both recognized and  unrecognized sources of infection  2.  Infection prevention handouts provided (general/device/diagnosis specific) and documented in Patient Education  3.  Educate patient and family/caregiver on isolation precautions if applicable  Outcome: Progressing     Problem: Wound/ / Incision Healing  Goal: Patient's wound/surgical incision will decrease in size and heals properly  Description: Target End Date:  Prior to discharge or change in level of care    Document on LDA    1.  Assess and document surgical incision/wound  2.  Provide incision/wound care per policy and/or provider orders  3.  Manage surgical drains per policy if applicable  4.  Encourage adequate nutrition to promote wound healing  5.  Collaborate with Clinical Dietician  Outcome: Progressing       Patient is not progressing towards the following goals:

## 2023-08-04 NOTE — PROGRESS NOTES
Received report from previous shift RN. Assumed care of patient at 1900.  Assessment complete.  Patient A&O x 4. Patient calls appropriately.  Patient ambulates with no assist.   Patient has 0/10 pain.    Denies N&V. Tolerating regular diet.  Old healed lap sites; erythema to lower abdomen.   + void, + flatus, 8/2 LBM.  Patient on RA, denies SOB.     Reviewed plan of care with patient. Call light and personal belongings within reach. Hourly rounding in place. All needs met at this time.

## 2023-08-04 NOTE — CARE PLAN
The patient is Stable - Low risk of patient condition declining or worsening    Shift Goals  Clinical Goals: cellulitis will continue to improve throught shift  Patient Goals: go home  Family Goals: patient comfort    Progress made toward(s) clinical / shift goals:  patient reports no pain, abdominal cellulitis improving    Patient is not progressing towards the following goals:

## 2023-08-04 NOTE — DISCHARGE SUMMARY
"Discharge Summary    CHIEF COMPLAINT ON ADMISSION  No chief complaint on file.      Reason for Admission  Cellulitis     Admission Date  7/31/2023    CODE STATUS  Full Code    HPI & HOSPITAL COURSE  Mrs. Monreal is a pleasant 83 year old female who was referred to our office for evaluation of a pelvic mass and elevated . She was seen for consultation on 4/12/12 and was recommended to have surgical pathological evaluation. She underwent a robotic assisted omentectomy, converted to exploratory laparotomy, total hysterectomy with BSO. Complete omentectomy and bilateral pelvic, aortic and renal lymph node dissection on 4/23/12. 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 She was not recommended any adjuvant treatment and is now being monitored with serial .     She presented on 4/27/21 for an annual cancer surveillance examination. She was last seen on 5/23/2018.   on 4/9/21 is 18.4. In late August she had been experiencing some right sided abdominal pain and underwent an abdominal and pelvic ultrasound, which were negative.      She presented on 5/24/21 for a 4 weeks for re-examination of the movable mass that was palpated at her last visit.  She denied any significant symptoms.      Mrs. Monreal presented on 2/16/23 for an annual cancer surveillance examination and c/o hernia; she had not been seen in office since May 2021. She reported \"gurgling\" in her abdomen at the site of her abdominal hernia, ongoing for 2 weeks. Also reported needing to strain to defecate although her stools remain soft in texture. Has stable LLE secondary to lymphedema since her surgery which is overall improved. Reported that she had not followed up due to COVID-19 pandemic.      She underwent a CT of abd/pelvis on 2/27/23 which showed status post hysterectomy, oophorectomy and there is small pelvic free fluid. Free fluid is nonspecific but given " history of ovarian carcinoma, clinical correlation and potential follow-up is warranted. Moderate colonic diverticulosis without evidence of diverticulitis. There is also a large duodenal diverticulum. Supraumbilical abdominal wall hernias contain small and large bowel but do not cause bowel obstruction/compromise.     Mrs. Monreal presents for review of her CT results. CT of abd/pelvis on 5/22/23 which showed a circumscribed 7.3 cm cystic lesion in the pelvis just above the vaginal cuff, with peripheral soft tissue nodules. It may represent recurrent malignancy/metastasis.   It is well-circumscribed and much larger than the pocket of fluid described on the prior CT study. No other suspicious lesions in the abdomen or pelvis. A 9.2 cm stable periumbilical hernia containing nonobstructed bowel loops. Colonic diverticulosis. She had a Colonoscopy at [facility] 4/18/23 stating it was all normal.     She was counseled about surgery and elected to proceed. She subsequently underwent a XL/debulking/ DAYSI/ SB rsxn/ventral hernia repair on 06/06/23 at [facility] performed by Dr. Chang. 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        Mrs. Monreal presents for a 7.5 week post-operative examination with concerns of oozing and redness from an abdominal incision. She was doing well until 4 days ago when she noted increased redness to her lower incision. It is firm to the touch. She denies any pain. She denies nausea or vomiting. She has no fever or chills.  She continues to have multiple loose bowel movements a day. She denies diarrhea. She otherwise feels like she is recovering from surgery, her energy has improved and she feels more like her normal self. She denies any vaginal bleeding, discharge, pelvic pain, or leg edema. She states her diet has been good, denies having any loss of appetite or early satiety. She denies experiencing any shortness  of breath or difficulty breathing. She was found to have abdominal cellulitis and was admitted for further management.     She was admitted for the above reason and started on IV antibiotics. A CT scan was obtained to evaluate which revealed no intraabdominal process and a small subcutaneous fluid collection. She had no leukocytosis and her vital signs were stable throughout hospitalization. She was monitored and her cellulitis improved significantly. She had no other concerns or complaints. Her physical exam was significant for an approximately 4 cm area of erythema to her lower abdomen with minimal induration, no fluctuance. She was transitioned to oral antibiotics.     Therefore, she is discharged in good and stable condition to home with close outpatient follow-up.    The patient met 2-midnight criteria for an inpatient stay at the time of discharge.    Discharge Date  8/4/2023    FOLLOW UP ITEMS POST DISCHARGE  Take Bactrim DS twice a day for two weeks   Follow up with [facility] in two weeks for evaluation   Monitor for signs and symptoms and call the office if your abdominal redness increases, becomes hard, warm to the touch, you develop fever or chills.     DISCHARGE DIAGNOSES  Cellulitis  Low grade serous ovarian cancer   HTN  Hypokalemia      FOLLOW UP  In two weeks at [facility]     MEDICATIONS ON DISCHARGE     Medication List        CONTINUE taking these medications        Instructions   metoprolol  MG Tb24  Commonly known as: Toprol XL   Take 100 mg by mouth every day.  Dose: 100 mg     potassium chloride 10 MEQ capsule  Commonly known as: Micro-K      valsartan-hydrochlorothiazide 160-12.5 MG per tablet  Commonly known as: Diovan-HCT   Take 1 Tab by mouth every day.  Dose: 1 Tablet              Allergies  Allergies   Allergen Reactions    Seasonal Runny Nose     Sneezing, runny nose       DIET  Orders Placed This Encounter   Procedures    Diet Order Diet: Regular     Standing Status:    Standing     Number of Occurrences:   1     Order Specific Question:   Diet:     Answer:   Regular [1]       ACTIVITY  As tolerated.  Weight bearing as tolerated    CONSULTATIONS  None    PROCEDURES  None    LABORATORY  Lab Results   Component Value Date    SODIUM 138 08/04/2023    POTASSIUM 3.5 (L) 08/04/2023    CHLORIDE 105 08/04/2023    CO2 25 08/04/2023    GLUCOSE 92 08/04/2023    BUN 7 (L) 08/04/2023    CREATININE 0.77 08/04/2023        Lab Results   Component Value Date    WBC 7.6 08/04/2023    HEMOGLOBIN 11.4 (L) 08/04/2023    HEMATOCRIT 34.7 (L) 08/04/2023    PLATELETCT 226 08/04/2023        Total time of the discharge process exceeds 10 minutes.

## 2023-08-07 ENCOUNTER — APPOINTMENT (OUTPATIENT)
Dept: RADIOLOGY | Facility: MEDICAL CENTER | Age: 83
End: 2023-08-07
Attending: STUDENT IN AN ORGANIZED HEALTH CARE EDUCATION/TRAINING PROGRAM
Payer: MEDICARE

## 2023-10-23 ENCOUNTER — HOSPITAL ENCOUNTER (OUTPATIENT)
Dept: LAB | Facility: MEDICAL CENTER | Age: 83
End: 2023-10-23
Attending: STUDENT IN AN ORGANIZED HEALTH CARE EDUCATION/TRAINING PROGRAM
Payer: MEDICARE

## 2023-10-23 PROCEDURE — 36415 COLL VENOUS BLD VENIPUNCTURE: CPT

## 2023-10-23 PROCEDURE — 86304 IMMUNOASSAY TUMOR CA 125: CPT

## 2023-10-24 LAB — CANCER AG125 SERPL-ACNC: 7.4 U/ML (ref 0–35)

## 2023-11-29 ENCOUNTER — PATIENT MESSAGE (OUTPATIENT)
Dept: HEALTH INFORMATION MANAGEMENT | Facility: OTHER | Age: 83
End: 2023-11-29

## 2024-01-19 ENCOUNTER — HOSPITAL ENCOUNTER (OUTPATIENT)
Dept: LAB | Facility: MEDICAL CENTER | Age: 84
End: 2024-01-19
Attending: STUDENT IN AN ORGANIZED HEALTH CARE EDUCATION/TRAINING PROGRAM
Payer: MEDICARE

## 2024-01-19 LAB — CANCER AG125 SERPL-ACNC: 8 U/ML (ref 0–35)

## 2024-01-19 PROCEDURE — 36415 COLL VENOUS BLD VENIPUNCTURE: CPT

## 2024-01-19 PROCEDURE — 86304 IMMUNOASSAY TUMOR CA 125: CPT

## 2024-05-02 ENCOUNTER — HOSPITAL ENCOUNTER (OUTPATIENT)
Dept: LAB | Facility: MEDICAL CENTER | Age: 84
End: 2024-05-02
Payer: MEDICARE

## 2024-05-03 LAB — CANCER AG125 SERPL-ACNC: 9.5 U/ML (ref 0–35)

## 2024-05-14 ENCOUNTER — HOSPITAL ENCOUNTER (OUTPATIENT)
Dept: RADIOLOGY | Facility: MEDICAL CENTER | Age: 84
End: 2024-05-14
Attending: INTERNAL MEDICINE
Payer: MEDICARE

## 2024-05-14 DIAGNOSIS — Z12.31 VISIT FOR SCREENING MAMMOGRAM: ICD-10-CM

## 2024-08-08 ENCOUNTER — HOSPITAL ENCOUNTER (OUTPATIENT)
Dept: LAB | Facility: MEDICAL CENTER | Age: 84
End: 2024-08-08
Attending: PHYSICIAN ASSISTANT
Payer: MEDICARE

## 2024-08-08 LAB — CANCER AG125 SERPL-ACNC: 8.6 U/ML (ref 0–35)

## 2024-08-08 PROCEDURE — 36415 COLL VENOUS BLD VENIPUNCTURE: CPT

## 2024-08-08 PROCEDURE — 86304 IMMUNOASSAY TUMOR CA 125: CPT

## 2024-08-31 ENCOUNTER — HOSPITAL ENCOUNTER (OUTPATIENT)
Dept: RADIOLOGY | Facility: MEDICAL CENTER | Age: 84
End: 2024-08-31
Attending: INTERNAL MEDICINE
Payer: MEDICARE

## 2024-08-31 DIAGNOSIS — R10.9 ACUTE ABDOMINAL PAIN: ICD-10-CM

## 2024-08-31 DIAGNOSIS — R10.13 ABDOMINAL PAIN, EPIGASTRIC: ICD-10-CM

## 2024-08-31 PROCEDURE — 76700 US EXAM ABDOM COMPLETE: CPT

## 2024-11-01 ENCOUNTER — HOSPITAL ENCOUNTER (OUTPATIENT)
Dept: LAB | Facility: MEDICAL CENTER | Age: 84
End: 2024-11-01
Attending: STUDENT IN AN ORGANIZED HEALTH CARE EDUCATION/TRAINING PROGRAM
Payer: MEDICARE

## 2024-11-01 LAB — CANCER AG125 SERPL-ACNC: 8.7 U/ML (ref 0–35)

## 2024-11-01 PROCEDURE — 36415 COLL VENOUS BLD VENIPUNCTURE: CPT

## 2024-11-01 PROCEDURE — 86304 IMMUNOASSAY TUMOR CA 125: CPT

## 2025-02-25 ENCOUNTER — HOSPITAL ENCOUNTER (OUTPATIENT)
Dept: LAB | Facility: MEDICAL CENTER | Age: 85
End: 2025-02-25
Attending: STUDENT IN AN ORGANIZED HEALTH CARE EDUCATION/TRAINING PROGRAM
Payer: MEDICARE

## 2025-02-25 LAB — CANCER AG125 SERPL-ACNC: 12 U/ML (ref 0–35)

## 2025-02-25 PROCEDURE — 36415 COLL VENOUS BLD VENIPUNCTURE: CPT

## 2025-02-25 PROCEDURE — 86304 IMMUNOASSAY TUMOR CA 125: CPT

## 2025-03-06 ENCOUNTER — HOSPITAL ENCOUNTER (OUTPATIENT)
Dept: LAB | Facility: MEDICAL CENTER | Age: 85
End: 2025-03-06
Attending: INTERNAL MEDICINE
Payer: MEDICARE

## 2025-03-06 LAB
ALBUMIN SERPL BCP-MCNC: 3.9 G/DL (ref 3.2–4.9)
ALBUMIN/GLOB SERPL: 1.3 G/DL
ALP SERPL-CCNC: 65 U/L (ref 30–99)
ALT SERPL-CCNC: 10 U/L (ref 2–50)
ANION GAP SERPL CALC-SCNC: 11 MMOL/L (ref 7–16)
AST SERPL-CCNC: 17 U/L (ref 12–45)
BASOPHILS # BLD AUTO: 1.2 % (ref 0–1.8)
BASOPHILS # BLD: 0.07 K/UL (ref 0–0.12)
BILIRUB SERPL-MCNC: 1 MG/DL (ref 0.1–1.5)
BUN SERPL-MCNC: 12 MG/DL (ref 8–22)
CALCIUM ALBUM COR SERPL-MCNC: 9.5 MG/DL (ref 8.5–10.5)
CALCIUM SERPL-MCNC: 9.4 MG/DL (ref 8.5–10.5)
CHLORIDE SERPL-SCNC: 101 MMOL/L (ref 96–112)
CHOLEST SERPL-MCNC: 180 MG/DL (ref 100–199)
CO2 SERPL-SCNC: 26 MMOL/L (ref 20–33)
CREAT SERPL-MCNC: 0.75 MG/DL (ref 0.5–1.4)
EOSINOPHIL # BLD AUTO: 0.25 K/UL (ref 0–0.51)
EOSINOPHIL NFR BLD: 4.3 % (ref 0–6.9)
ERYTHROCYTE [DISTWIDTH] IN BLOOD BY AUTOMATED COUNT: 45 FL (ref 35.9–50)
FASTING STATUS PATIENT QL REPORTED: NORMAL
GFR SERPLBLD CREATININE-BSD FMLA CKD-EPI: 77 ML/MIN/1.73 M 2
GLOBULIN SER CALC-MCNC: 3.1 G/DL (ref 1.9–3.5)
GLUCOSE SERPL-MCNC: 107 MG/DL (ref 65–99)
HCT VFR BLD AUTO: 45.5 % (ref 37–47)
HDLC SERPL-MCNC: 68 MG/DL
HGB BLD-MCNC: 14.9 G/DL (ref 12–16)
IMM GRANULOCYTES # BLD AUTO: 0.01 K/UL (ref 0–0.11)
IMM GRANULOCYTES NFR BLD AUTO: 0.2 % (ref 0–0.9)
LDLC SERPL CALC-MCNC: 90 MG/DL
LYMPHOCYTES # BLD AUTO: 1.75 K/UL (ref 1–4.8)
LYMPHOCYTES NFR BLD: 30 % (ref 22–41)
MCH RBC QN AUTO: 31.9 PG (ref 27–33)
MCHC RBC AUTO-ENTMCNC: 32.7 G/DL (ref 32.2–35.5)
MCV RBC AUTO: 97.4 FL (ref 81.4–97.8)
MONOCYTES # BLD AUTO: 0.61 K/UL (ref 0–0.85)
MONOCYTES NFR BLD AUTO: 10.5 % (ref 0–13.4)
NEUTROPHILS # BLD AUTO: 3.14 K/UL (ref 1.82–7.42)
NEUTROPHILS NFR BLD: 53.8 % (ref 44–72)
NRBC # BLD AUTO: 0 K/UL
NRBC BLD-RTO: 0 /100 WBC (ref 0–0.2)
PLATELET # BLD AUTO: 205 K/UL (ref 164–446)
PMV BLD AUTO: 10.3 FL (ref 9–12.9)
POTASSIUM SERPL-SCNC: 4.5 MMOL/L (ref 3.6–5.5)
PROT SERPL-MCNC: 7 G/DL (ref 6–8.2)
RBC # BLD AUTO: 4.67 M/UL (ref 4.2–5.4)
SODIUM SERPL-SCNC: 138 MMOL/L (ref 135–145)
T4 SERPL-MCNC: 6.8 UG/DL (ref 4–12)
TRIGL SERPL-MCNC: 109 MG/DL (ref 0–149)
TSH SERPL-ACNC: 3.6 UIU/ML (ref 0.35–5.5)
WBC # BLD AUTO: 5.8 K/UL (ref 4.8–10.8)

## 2025-03-06 PROCEDURE — 80053 COMPREHEN METABOLIC PANEL: CPT

## 2025-03-06 PROCEDURE — 84443 ASSAY THYROID STIM HORMONE: CPT

## 2025-03-06 PROCEDURE — 36415 COLL VENOUS BLD VENIPUNCTURE: CPT

## 2025-03-06 PROCEDURE — 84436 ASSAY OF TOTAL THYROXINE: CPT

## 2025-03-06 PROCEDURE — 80061 LIPID PANEL: CPT

## 2025-03-06 PROCEDURE — 85025 COMPLETE CBC W/AUTO DIFF WBC: CPT

## 2025-06-13 ENCOUNTER — HOSPITAL ENCOUNTER (OUTPATIENT)
Dept: LAB | Facility: MEDICAL CENTER | Age: 85
End: 2025-06-13
Attending: PHYSICIAN ASSISTANT
Payer: MEDICARE

## 2025-06-13 LAB — CANCER AG125 SERPL-ACNC: 27.9 U/ML (ref 0–35)

## 2025-06-13 PROCEDURE — 86304 IMMUNOASSAY TUMOR CA 125: CPT

## 2025-06-13 PROCEDURE — 36415 COLL VENOUS BLD VENIPUNCTURE: CPT

## 2025-08-19 ENCOUNTER — HOSPITAL ENCOUNTER (OUTPATIENT)
Dept: RADIOLOGY | Facility: MEDICAL CENTER | Age: 85
End: 2025-08-19
Attending: SPECIALIST
Payer: MEDICARE

## 2025-08-19 VITALS — WEIGHT: 135 LBS | HEIGHT: 63 IN | BODY MASS INDEX: 23.92 KG/M2

## 2025-08-19 DIAGNOSIS — Z12.31 VISIT FOR SCREENING MAMMOGRAM: ICD-10-CM

## 2025-08-19 PROCEDURE — 77067 SCR MAMMO BI INCL CAD: CPT

## 2025-08-19 ASSESSMENT — FIBROSIS 4 INDEX: FIB4 SCORE: 2.28
